# Patient Record
Sex: MALE | Race: ASIAN | NOT HISPANIC OR LATINO
[De-identification: names, ages, dates, MRNs, and addresses within clinical notes are randomized per-mention and may not be internally consistent; named-entity substitution may affect disease eponyms.]

---

## 2017-11-08 PROBLEM — Z00.00 ENCOUNTER FOR PREVENTIVE HEALTH EXAMINATION: Noted: 2017-11-08

## 2017-11-15 ENCOUNTER — APPOINTMENT (OUTPATIENT)
Dept: ORTHOPEDIC SURGERY | Facility: CLINIC | Age: 67
End: 2017-11-15
Payer: MEDICARE

## 2017-11-15 VITALS
DIASTOLIC BLOOD PRESSURE: 85 MMHG | HEIGHT: 69 IN | WEIGHT: 212 LBS | HEART RATE: 98 BPM | SYSTOLIC BLOOD PRESSURE: 148 MMHG | BODY MASS INDEX: 31.4 KG/M2

## 2017-11-15 DIAGNOSIS — Z78.9 OTHER SPECIFIED HEALTH STATUS: ICD-10-CM

## 2017-11-15 DIAGNOSIS — Z86.39 PERSONAL HISTORY OF OTHER ENDOCRINE, NUTRITIONAL AND METABOLIC DISEASE: ICD-10-CM

## 2017-11-15 PROCEDURE — 73564 X-RAY EXAM KNEE 4 OR MORE: CPT | Mod: 50

## 2017-11-15 PROCEDURE — 99204 OFFICE O/P NEW MOD 45 MIN: CPT | Mod: 25

## 2017-11-15 PROCEDURE — 20610 DRAIN/INJ JOINT/BURSA W/O US: CPT | Mod: 50

## 2017-11-15 RX ORDER — METFORMIN ER 500 MG 500 MG/1
500 TABLET ORAL
Qty: 360 | Refills: 0 | Status: ACTIVE | COMMUNITY
Start: 2017-04-03

## 2017-11-15 RX ORDER — PANTOPRAZOLE 40 MG/1
40 TABLET, DELAYED RELEASE ORAL
Qty: 90 | Refills: 0 | Status: ACTIVE | COMMUNITY
Start: 2017-05-10

## 2017-11-15 RX ORDER — 70%ISOPROPYL ALCOHOL 0.7 ML/ML
70 SWAB TOPICAL
Qty: 300 | Refills: 0 | Status: ACTIVE | COMMUNITY
Start: 2017-05-30

## 2017-11-15 RX ORDER — SITAGLIPTIN 50 MG/1
50 TABLET, FILM COATED ORAL
Qty: 90 | Refills: 0 | Status: ACTIVE | COMMUNITY
Start: 2017-04-17

## 2017-11-15 RX ORDER — GABAPENTIN 100 MG/1
100 CAPSULE ORAL
Qty: 90 | Refills: 0 | Status: ACTIVE | COMMUNITY
Start: 2017-04-15

## 2017-11-15 RX ORDER — COLCHICINE 0.6 MG/1
0.6 TABLET ORAL
Qty: 90 | Refills: 0 | Status: ACTIVE | COMMUNITY
Start: 2017-03-06

## 2017-11-15 RX ORDER — LINAGLIPTIN 5 MG/1
5 TABLET, FILM COATED ORAL
Qty: 90 | Refills: 0 | Status: ACTIVE | COMMUNITY
Start: 2017-05-10

## 2017-11-15 RX ORDER — AMITRIPTYLINE HYDROCHLORIDE 25 MG/1
25 TABLET, FILM COATED ORAL
Qty: 180 | Refills: 0 | Status: ACTIVE | COMMUNITY
Start: 2017-06-14

## 2017-11-15 RX ORDER — MELOXICAM 15 MG/1
15 TABLET ORAL DAILY
Qty: 30 | Refills: 2 | Status: ACTIVE | COMMUNITY
Start: 2017-11-15 | End: 1900-01-01

## 2017-11-15 RX ORDER — EMPAGLIFLOZIN 10 MG/1
10 TABLET, FILM COATED ORAL
Qty: 30 | Refills: 0 | Status: ACTIVE | COMMUNITY
Start: 2017-01-10

## 2017-11-15 RX ORDER — ALLOPURINOL 300 MG/1
300 TABLET ORAL
Qty: 90 | Refills: 0 | Status: ACTIVE | COMMUNITY
Start: 2017-03-09

## 2017-11-15 RX ORDER — DILTIAZEM HYDROCHLORIDE 300 MG/1
300 CAPSULE, EXTENDED RELEASE ORAL
Qty: 30 | Refills: 0 | Status: ACTIVE | COMMUNITY
Start: 2016-12-27

## 2017-11-15 RX ORDER — GLIMEPIRIDE 4 MG/1
4 TABLET ORAL
Qty: 180 | Refills: 0 | Status: ACTIVE | COMMUNITY
Start: 2017-03-22

## 2017-11-15 RX ORDER — ATORVASTATIN CALCIUM 10 MG/1
10 TABLET, FILM COATED ORAL
Qty: 90 | Refills: 0 | Status: ACTIVE | COMMUNITY
Start: 2017-05-10

## 2017-11-15 RX ORDER — FUROSEMIDE 20 MG/1
20 TABLET ORAL
Qty: 45 | Refills: 0 | Status: ACTIVE | COMMUNITY
Start: 2017-05-10

## 2017-11-23 ENCOUNTER — INPATIENT (INPATIENT)
Facility: HOSPITAL | Age: 67
LOS: 0 days | Discharge: LEFT BEFORE DISCHARGE | End: 2017-11-24
Attending: HOSPITALIST | Admitting: HOSPITALIST

## 2017-11-23 VITALS
TEMPERATURE: 98 F | HEART RATE: 104 BPM | OXYGEN SATURATION: 100 % | SYSTOLIC BLOOD PRESSURE: 169 MMHG | RESPIRATION RATE: 16 BRPM | DIASTOLIC BLOOD PRESSURE: 97 MMHG

## 2017-11-23 VITALS
SYSTOLIC BLOOD PRESSURE: 159 MMHG | DIASTOLIC BLOOD PRESSURE: 64 MMHG | OXYGEN SATURATION: 99 % | HEART RATE: 98 BPM | RESPIRATION RATE: 18 BRPM

## 2017-11-23 DIAGNOSIS — E87.5 HYPERKALEMIA: ICD-10-CM

## 2017-11-23 LAB
ALBUMIN SERPL ELPH-MCNC: 4.3 G/DL — SIGNIFICANT CHANGE UP (ref 3.3–5)
ALP SERPL-CCNC: 137 U/L — HIGH (ref 40–120)
ALT FLD-CCNC: 77 U/L — HIGH (ref 4–41)
AST SERPL-CCNC: 35 U/L — SIGNIFICANT CHANGE UP (ref 4–40)
BASE EXCESS BLDV CALC-SCNC: 2.5 MMOL/L — SIGNIFICANT CHANGE UP
BASOPHILS # BLD AUTO: 0.07 K/UL — SIGNIFICANT CHANGE UP (ref 0–0.2)
BASOPHILS NFR BLD AUTO: 0.4 % — SIGNIFICANT CHANGE UP (ref 0–2)
BILIRUB SERPL-MCNC: 0.5 MG/DL — SIGNIFICANT CHANGE UP (ref 0.2–1.2)
BLOOD GAS VENOUS - CREATININE: 1.69 MG/DL — HIGH (ref 0.5–1.3)
BUN SERPL-MCNC: 44 MG/DL — HIGH (ref 7–23)
CALCIUM SERPL-MCNC: 9.8 MG/DL — SIGNIFICANT CHANGE UP (ref 8.4–10.5)
CHLORIDE BLDV-SCNC: 102 MMOL/L — SIGNIFICANT CHANGE UP (ref 96–108)
CHLORIDE SERPL-SCNC: 97 MMOL/L — LOW (ref 98–107)
CO2 SERPL-SCNC: 23 MMOL/L — SIGNIFICANT CHANGE UP (ref 22–31)
CREAT SERPL-MCNC: 1.83 MG/DL — HIGH (ref 0.5–1.3)
EOSINOPHIL # BLD AUTO: 0.75 K/UL — HIGH (ref 0–0.5)
EOSINOPHIL NFR BLD AUTO: 4.1 % — SIGNIFICANT CHANGE UP (ref 0–6)
GAS PNL BLDV: 127 MMOL/L — LOW (ref 136–146)
GLUCOSE BLDV-MCNC: 98 — SIGNIFICANT CHANGE UP (ref 70–99)
GLUCOSE SERPL-MCNC: 98 MG/DL — SIGNIFICANT CHANGE UP (ref 70–99)
HCO3 BLDV-SCNC: 24 MMOL/L — SIGNIFICANT CHANGE UP (ref 20–27)
HCT VFR BLD CALC: 44.7 % — SIGNIFICANT CHANGE UP (ref 39–50)
HCT VFR BLDV CALC: 46.5 % — SIGNIFICANT CHANGE UP (ref 39–51)
HGB BLD-MCNC: 14.7 G/DL — SIGNIFICANT CHANGE UP (ref 13–17)
HGB BLDV-MCNC: 15.2 G/DL — SIGNIFICANT CHANGE UP (ref 13–17)
IMM GRANULOCYTES # BLD AUTO: 0.32 # — SIGNIFICANT CHANGE UP
IMM GRANULOCYTES NFR BLD AUTO: 1.8 % — HIGH (ref 0–1.5)
LACTATE BLDV-MCNC: 2.6 MMOL/L — HIGH (ref 0.5–2)
LYMPHOCYTES # BLD AUTO: 11.4 % — LOW (ref 13–44)
LYMPHOCYTES # BLD AUTO: 2.07 K/UL — SIGNIFICANT CHANGE UP (ref 1–3.3)
MCHC RBC-ENTMCNC: 29.2 PG — SIGNIFICANT CHANGE UP (ref 27–34)
MCHC RBC-ENTMCNC: 32.9 % — SIGNIFICANT CHANGE UP (ref 32–36)
MCV RBC AUTO: 88.9 FL — SIGNIFICANT CHANGE UP (ref 80–100)
MONOCYTES # BLD AUTO: 1.24 K/UL — HIGH (ref 0–0.9)
MONOCYTES NFR BLD AUTO: 6.9 % — SIGNIFICANT CHANGE UP (ref 2–14)
NEUTROPHILS # BLD AUTO: 13.65 K/UL — HIGH (ref 1.8–7.4)
NEUTROPHILS NFR BLD AUTO: 75.4 % — SIGNIFICANT CHANGE UP (ref 43–77)
NRBC # FLD: 0 — SIGNIFICANT CHANGE UP
PCO2 BLDV: 53 MMHG — HIGH (ref 41–51)
PH BLDV: 7.34 PH — SIGNIFICANT CHANGE UP (ref 7.32–7.43)
PLATELET # BLD AUTO: 212 K/UL — SIGNIFICANT CHANGE UP (ref 150–400)
PMV BLD: 10 FL — SIGNIFICANT CHANGE UP (ref 7–13)
PO2 BLDV: 26 MMHG — LOW (ref 35–40)
POTASSIUM BLDV-SCNC: 6 MMOL/L — HIGH (ref 3.4–4.5)
POTASSIUM SERPL-MCNC: 5.7 MMOL/L — HIGH (ref 3.5–5.3)
POTASSIUM SERPL-MCNC: 6.7 MMOL/L — CRITICAL HIGH (ref 3.5–5.3)
POTASSIUM SERPL-SCNC: 5.7 MMOL/L — HIGH (ref 3.5–5.3)
POTASSIUM SERPL-SCNC: 6.7 MMOL/L — CRITICAL HIGH (ref 3.5–5.3)
PROT SERPL-MCNC: 7.4 G/DL — SIGNIFICANT CHANGE UP (ref 6–8.3)
RBC # BLD: 5.03 M/UL — SIGNIFICANT CHANGE UP (ref 4.2–5.8)
RBC # FLD: 14 % — SIGNIFICANT CHANGE UP (ref 10.3–14.5)
SAO2 % BLDV: 39.9 % — LOW (ref 60–85)
SODIUM SERPL-SCNC: 133 MMOL/L — LOW (ref 135–145)
WBC # BLD: 18.1 K/UL — HIGH (ref 3.8–10.5)
WBC # FLD AUTO: 18.1 K/UL — HIGH (ref 3.8–10.5)

## 2017-11-23 RX ORDER — INSULIN HUMAN 100 [IU]/ML
10 INJECTION, SOLUTION SUBCUTANEOUS ONCE
Refills: 0 | Status: COMPLETED | OUTPATIENT
Start: 2017-11-23 | End: 2017-11-23

## 2017-11-23 RX ORDER — SODIUM CHLORIDE 9 MG/ML
1000 INJECTION INTRAMUSCULAR; INTRAVENOUS; SUBCUTANEOUS ONCE
Refills: 0 | Status: COMPLETED | OUTPATIENT
Start: 2017-11-23 | End: 2017-11-23

## 2017-11-23 RX ORDER — CALCIUM GLUCONATE 100 MG/ML
1 VIAL (ML) INTRAVENOUS ONCE
Refills: 0 | Status: COMPLETED | OUTPATIENT
Start: 2017-11-23 | End: 2017-11-23

## 2017-11-23 RX ORDER — SODIUM POLYSTYRENE SULFONATE 4.1 MEQ/G
30 POWDER, FOR SUSPENSION ORAL ONCE
Refills: 0 | Status: COMPLETED | OUTPATIENT
Start: 2017-11-23 | End: 2017-11-23

## 2017-11-23 RX ORDER — SODIUM BICARBONATE 1 MEQ/ML
50 SYRINGE (ML) INTRAVENOUS ONCE
Refills: 0 | Status: COMPLETED | OUTPATIENT
Start: 2017-11-23 | End: 2017-11-23

## 2017-11-23 RX ORDER — ALBUTEROL 90 UG/1
2.5 AEROSOL, METERED ORAL
Refills: 0 | Status: COMPLETED | OUTPATIENT
Start: 2017-11-23 | End: 2017-11-23

## 2017-11-23 RX ORDER — CALCIUM GLUCONATE 100 MG/ML
2 VIAL (ML) INTRAVENOUS ONCE
Refills: 0 | Status: DISCONTINUED | OUTPATIENT
Start: 2017-11-23 | End: 2017-11-23

## 2017-11-23 RX ORDER — ALBUTEROL 90 UG/1
2.5 AEROSOL, METERED ORAL ONCE
Refills: 0 | Status: COMPLETED | OUTPATIENT
Start: 2017-11-23 | End: 2017-11-23

## 2017-11-23 RX ORDER — DEXTROSE 50 % IN WATER 50 %
50 SYRINGE (ML) INTRAVENOUS ONCE
Refills: 0 | Status: COMPLETED | OUTPATIENT
Start: 2017-11-23 | End: 2017-11-23

## 2017-11-23 RX ADMIN — Medication 50 MILLIEQUIVALENT(S): at 14:43

## 2017-11-23 RX ADMIN — ALBUTEROL 2.5 MILLIGRAM(S): 90 AEROSOL, METERED ORAL at 15:09

## 2017-11-23 RX ADMIN — Medication 50 MILLILITER(S): at 14:43

## 2017-11-23 RX ADMIN — ALBUTEROL 2.5 MILLIGRAM(S): 90 AEROSOL, METERED ORAL at 18:27

## 2017-11-23 RX ADMIN — Medication 50 MILLIEQUIVALENT(S): at 18:27

## 2017-11-23 RX ADMIN — Medication 200 GRAM(S): at 14:43

## 2017-11-23 RX ADMIN — SODIUM POLYSTYRENE SULFONATE 30 GRAM(S): 4.1 POWDER, FOR SUSPENSION ORAL at 17:52

## 2017-11-23 RX ADMIN — ALBUTEROL 2.5 MILLIGRAM(S): 90 AEROSOL, METERED ORAL at 14:24

## 2017-11-23 RX ADMIN — SODIUM CHLORIDE 1000 MILLILITER(S): 9 INJECTION INTRAMUSCULAR; INTRAVENOUS; SUBCUTANEOUS at 13:36

## 2017-11-23 RX ADMIN — INSULIN HUMAN 10 UNIT(S): 100 INJECTION, SOLUTION SUBCUTANEOUS at 14:43

## 2017-11-23 RX ADMIN — ALBUTEROL 2.5 MILLIGRAM(S): 90 AEROSOL, METERED ORAL at 14:44

## 2017-11-23 NOTE — ED PROVIDER NOTE - ATTENDING CONTRIBUTION TO CARE
I performed a history and physical exam of the patient and discussed their management with the resident. I reviewed the resident's note and agree with the documented findings and plan of care. My medical decision making and observations are found above.  Lungs clear, abd soft.

## 2017-11-23 NOTE — ED PROVIDER NOTE - CARE PLAN
Instructions for follow-up, activity and diet:	The patient was given verbal and written discharge instructions. Specifically, instructions when to return to the ED and when to seek follow-up from their pcp was discussed. Any specialty follow-up was discussed, including how to make an appointment.  Instructions were discussed in simple, plain language and was understood by the patient. The patient understands that should their symptoms worsen or any new symptoms arise, they should return to the ED immediately for further evaluation. Principal Discharge DX:	Hyperkalemia  Instructions for follow-up, activity and diet:	The patient was given verbal and written discharge instructions. Specifically, instructions when to return to the ED and when to seek follow-up from their pcp was discussed. Any specialty follow-up was discussed, including how to make an appointment.  Instructions were discussed in simple, plain language and was understood by the patient. The patient understands that should their symptoms worsen or any new symptoms arise, they should return to the ED immediately for further evaluation.

## 2017-11-23 NOTE — ED PROVIDER NOTE - PROGRESS NOTE DETAILS
CHONG Hendricks called for admission. Awaiting callback. Jonathon Erickson called for admission. Awaiting callback. Kamla: Jonathon Erickson called for admission. Awaiting callback. Kamla: Received callback from [facility]. Informed her of pt status here. [facility] does not admit to [facility] and requests admission. Hospitalist paged. Awaiting callback. Kamla: Pt received a bed but now refuses to stay b/c he wants to go home for thanksgiving. Has multiple family members visiting and does not want them to think he is sick and in the hospital. Risks of leaving including cardiac arrest 2/2 hyperkalemia discused at length with patient and he still refuses to stay. He states he will AMA and then return after dinner to be admitted. Discussed plan with hospitalist Jeanine. Pt given kayxalate. Will administer another dose bicarb and albuterol prior to dc.

## 2017-11-23 NOTE — ED PROVIDER NOTE - OBJECTIVE STATEMENT
67yM hx htn, hld, dm, gout sent to ED from PMD office for elevated K on screening bloodwork. Pt sent to ED for elevated K in 2016 and found to have K 4.4 in ED (subsequently discharged). Had bl knee steroid injxn 1wk ago and had fsg >600 the night following injxn. Went to [place] ED that night and found to have elevated K per patient but again was discharged from ED after several hours. Pt checks sugar daily and has been low 100's the rest of the week. Saw doc on call for PMLOUISE Moore yesterday ([place]) for followup visit and had basic labs drawn. Called to return to ED today. Denies all symptoms. Well appearing. 67yM hx htn (on enalapril), hld, dm, gout sent to ED from PMD office for elevated K on screening bloodwork. Pt sent to ED for elevated K in 2016 and found to have K 4.4 in ED (subsequently discharged). Had bl knee steroid injxn 1wk ago and had fsg >600 the night following injxn. Went to [place] ED that night and found to have elevated K per patient but again was discharged from ED after several hours. Pt checks sugar daily and has been low 100's the rest of the week. Saw doc on call for PMD Teresa yesterday ([place]) for followup visit and had basic labs drawn. Called to return to ED today. Denies all symptoms. Well appearing.

## 2017-11-23 NOTE — ED PROVIDER NOTE - MEDICAL DECISION MAKING DETAILS
hyperK in pmd office on screening labwork, no symptoms->ekg, repeat K to confirm if it is actually elevated hyperK in pmd office on screening labwork, no symptoms->ekg, repeat K to confirm if it is actually elevated  Brandon: Patient sent in for high K+ from office.  No complaints. Recent steroidss with high glucose. No fever, no cough, no urinary co. Recheck K+ and hydrate hyperK in pmd office on screening labwork, no symptoms->ekg, repeat K to confirm if it is actually elevated.  Brandon: Patient sent in for high K+ from office.  No complaints. Recent steroids with high glucose. No fever, no cough, no urinary co. Recheck K+ and hydrate

## 2017-11-23 NOTE — ED ADULT TRIAGE NOTE - CHIEF COMPLAINT QUOTE
Pt coming into ED for eval of elevated K+ as per MD, value around 7.0 as per wife. Pt denies any cp/discomfort, denies headache/dizziness, tachycardic in triage. Pt denies sob breathing even and unlabored. EKG to be obtained.

## 2017-11-23 NOTE — ED ADULT NURSE NOTE - OBJECTIVE STATEMENT
AOX3, ambulatory with steady gait, was referred to the ED for elevated potassium that was drawn yesterday during a routine visit to MD, K around 7 as per wife. Pt denies any physical complaints, comfortable laying in stretcher, connected to the monitor:NSR, VS stable as noted, MD torre done, 18g placed on R AC, NAD, will monitor, wife at bedside.

## 2018-01-03 ENCOUNTER — APPOINTMENT (OUTPATIENT)
Dept: ORTHOPEDIC SURGERY | Facility: CLINIC | Age: 68
End: 2018-01-03

## 2018-01-06 ENCOUNTER — EMERGENCY (EMERGENCY)
Facility: HOSPITAL | Age: 68
LOS: 1 days | Discharge: HOME | End: 2018-01-06
Attending: EMERGENCY MEDICINE | Admitting: EMERGENCY MEDICINE
Payer: MEDICARE

## 2018-01-06 VITALS — RESPIRATION RATE: 18 BRPM | HEART RATE: 82 BPM | DIASTOLIC BLOOD PRESSURE: 98 MMHG | SYSTOLIC BLOOD PRESSURE: 146 MMHG

## 2018-01-06 VITALS
HEART RATE: 77 BPM | SYSTOLIC BLOOD PRESSURE: 133 MMHG | RESPIRATION RATE: 16 BRPM | DIASTOLIC BLOOD PRESSURE: 74 MMHG | OXYGEN SATURATION: 96 %

## 2018-01-06 LAB
ALBUMIN SERPL ELPH-MCNC: 3.9 G/DL — SIGNIFICANT CHANGE UP (ref 3.3–5)
ALP SERPL-CCNC: 140 U/L — HIGH (ref 40–120)
ALT FLD-CCNC: 36 U/L RC — SIGNIFICANT CHANGE UP (ref 10–45)
ANION GAP SERPL CALC-SCNC: 17 MMOL/L — SIGNIFICANT CHANGE UP (ref 5–17)
APTT BLD: 31.1 SEC — SIGNIFICANT CHANGE UP (ref 27.5–37.4)
AST SERPL-CCNC: 24 U/L — SIGNIFICANT CHANGE UP (ref 10–40)
BASE EXCESS BLDV CALC-SCNC: -3.9 MMOL/L — LOW (ref -2–2)
BASOPHILS # BLD AUTO: 0 K/UL — SIGNIFICANT CHANGE UP (ref 0–0.2)
BASOPHILS NFR BLD AUTO: 0.2 % — SIGNIFICANT CHANGE UP (ref 0–2)
BILIRUB SERPL-MCNC: 0.2 MG/DL — SIGNIFICANT CHANGE UP (ref 0.2–1.2)
BLD GP AB SCN SERPL QL: NEGATIVE — SIGNIFICANT CHANGE UP
BUN SERPL-MCNC: 15 MG/DL — SIGNIFICANT CHANGE UP (ref 7–23)
CA-I SERPL-SCNC: 1.1 MMOL/L — LOW (ref 1.12–1.3)
CALCIUM SERPL-MCNC: 8.5 MG/DL — SIGNIFICANT CHANGE UP (ref 8.4–10.5)
CHLORIDE BLDV-SCNC: 103 MMOL/L — SIGNIFICANT CHANGE UP (ref 96–108)
CHLORIDE SERPL-SCNC: 97 MMOL/L — SIGNIFICANT CHANGE UP (ref 96–108)
CO2 BLDV-SCNC: 22 MMOL/L — SIGNIFICANT CHANGE UP (ref 22–30)
CO2 SERPL-SCNC: 17 MMOL/L — LOW (ref 22–31)
CREAT SERPL-MCNC: 1.36 MG/DL — HIGH (ref 0.5–1.3)
EOSINOPHIL # BLD AUTO: 0.1 K/UL — SIGNIFICANT CHANGE UP (ref 0–0.5)
EOSINOPHIL NFR BLD AUTO: 1.3 % — SIGNIFICANT CHANGE UP (ref 0–6)
ETHANOL SERPL-MCNC: 232 MG/DL — HIGH (ref 0–10)
GAS PNL BLDV: 131 MMOL/L — LOW (ref 136–145)
GAS PNL BLDV: SIGNIFICANT CHANGE UP
GAS PNL BLDV: SIGNIFICANT CHANGE UP
GLUCOSE BLDV-MCNC: 243 MG/DL — HIGH (ref 70–99)
GLUCOSE SERPL-MCNC: 271 MG/DL — HIGH (ref 70–99)
HCO3 BLDV-SCNC: 21 MMOL/L — SIGNIFICANT CHANGE UP (ref 21–29)
HCT VFR BLD CALC: 41.7 % — SIGNIFICANT CHANGE UP (ref 39–50)
HCT VFR BLDA CALC: 40 % — SIGNIFICANT CHANGE UP (ref 39–50)
HGB BLD CALC-MCNC: 13.2 G/DL — SIGNIFICANT CHANGE UP (ref 13–17)
HGB BLD-MCNC: 14.9 G/DL — SIGNIFICANT CHANGE UP (ref 13–17)
HOROWITZ INDEX BLDV+IHG-RTO: SIGNIFICANT CHANGE UP
INR BLD: 0.95 RATIO — SIGNIFICANT CHANGE UP (ref 0.88–1.16)
LACTATE BLDV-MCNC: 2.3 MMOL/L — HIGH (ref 0.7–2)
LIDOCAIN IGE QN: 54 U/L — SIGNIFICANT CHANGE UP (ref 7–60)
LYMPHOCYTES # BLD AUTO: 28.4 % — SIGNIFICANT CHANGE UP (ref 13–44)
LYMPHOCYTES # BLD AUTO: 3 K/UL — SIGNIFICANT CHANGE UP (ref 1–3.3)
MCHC RBC-ENTMCNC: 33.3 PG — SIGNIFICANT CHANGE UP (ref 27–34)
MCHC RBC-ENTMCNC: 35.6 GM/DL — SIGNIFICANT CHANGE UP (ref 32–36)
MCV RBC AUTO: 93.4 FL — SIGNIFICANT CHANGE UP (ref 80–100)
MONOCYTES # BLD AUTO: 0.7 K/UL — SIGNIFICANT CHANGE UP (ref 0–0.9)
MONOCYTES NFR BLD AUTO: 6.8 % — SIGNIFICANT CHANGE UP (ref 2–14)
NEUTROPHILS # BLD AUTO: 6.8 K/UL — SIGNIFICANT CHANGE UP (ref 1.8–7.4)
NEUTROPHILS NFR BLD AUTO: 63.3 % — SIGNIFICANT CHANGE UP (ref 43–77)
PCO2 BLDV: 40 MMHG — SIGNIFICANT CHANGE UP (ref 35–50)
PH BLDV: 7.34 — LOW (ref 7.35–7.45)
PLATELET # BLD AUTO: 153 K/UL — SIGNIFICANT CHANGE UP (ref 150–400)
PO2 BLDV: 101 MMHG — HIGH (ref 25–45)
POTASSIUM BLDV-SCNC: 4.2 MMOL/L — SIGNIFICANT CHANGE UP (ref 3.5–5)
POTASSIUM SERPL-MCNC: 4.6 MMOL/L — SIGNIFICANT CHANGE UP (ref 3.5–5.3)
POTASSIUM SERPL-SCNC: 4.6 MMOL/L — SIGNIFICANT CHANGE UP (ref 3.5–5.3)
PROT SERPL-MCNC: 7.1 G/DL — SIGNIFICANT CHANGE UP (ref 6–8.3)
PROTHROM AB SERPL-ACNC: 10.3 SEC — SIGNIFICANT CHANGE UP (ref 9.8–12.7)
RBC # BLD: 4.47 M/UL — SIGNIFICANT CHANGE UP (ref 4.2–5.8)
RBC # FLD: 13.5 % — SIGNIFICANT CHANGE UP (ref 10.3–14.5)
RH IG SCN BLD-IMP: POSITIVE — SIGNIFICANT CHANGE UP
SAO2 % BLDV: 97 % — HIGH (ref 67–88)
SODIUM SERPL-SCNC: 131 MMOL/L — LOW (ref 135–145)
WBC # BLD: 10.7 K/UL — HIGH (ref 3.8–10.5)
WBC # FLD AUTO: 10.7 K/UL — HIGH (ref 3.8–10.5)

## 2018-01-06 PROCEDURE — 82947 ASSAY GLUCOSE BLOOD QUANT: CPT

## 2018-01-06 PROCEDURE — 72125 CT NECK SPINE W/O DYE: CPT | Mod: 26

## 2018-01-06 PROCEDURE — 85014 HEMATOCRIT: CPT

## 2018-01-06 PROCEDURE — 93010 ELECTROCARDIOGRAM REPORT: CPT

## 2018-01-06 PROCEDURE — 71045 X-RAY EXAM CHEST 1 VIEW: CPT | Mod: 26

## 2018-01-06 PROCEDURE — 70450 CT HEAD/BRAIN W/O DYE: CPT | Mod: 26

## 2018-01-06 PROCEDURE — 85610 PROTHROMBIN TIME: CPT

## 2018-01-06 PROCEDURE — 82803 BLOOD GASES ANY COMBINATION: CPT

## 2018-01-06 PROCEDURE — 71045 X-RAY EXAM CHEST 1 VIEW: CPT

## 2018-01-06 PROCEDURE — 86900 BLOOD TYPING SEROLOGIC ABO: CPT

## 2018-01-06 PROCEDURE — 74177 CT ABD & PELVIS W/CONTRAST: CPT | Mod: 26

## 2018-01-06 PROCEDURE — 80053 COMPREHEN METABOLIC PANEL: CPT

## 2018-01-06 PROCEDURE — 86901 BLOOD TYPING SEROLOGIC RH(D): CPT

## 2018-01-06 PROCEDURE — 93005 ELECTROCARDIOGRAM TRACING: CPT

## 2018-01-06 PROCEDURE — 84132 ASSAY OF SERUM POTASSIUM: CPT

## 2018-01-06 PROCEDURE — 82962 GLUCOSE BLOOD TEST: CPT

## 2018-01-06 PROCEDURE — 85730 THROMBOPLASTIN TIME PARTIAL: CPT

## 2018-01-06 PROCEDURE — 82330 ASSAY OF CALCIUM: CPT

## 2018-01-06 PROCEDURE — 73110 X-RAY EXAM OF WRIST: CPT | Mod: 26,LT

## 2018-01-06 PROCEDURE — 83605 ASSAY OF LACTIC ACID: CPT

## 2018-01-06 PROCEDURE — 84295 ASSAY OF SERUM SODIUM: CPT

## 2018-01-06 PROCEDURE — 70450 CT HEAD/BRAIN W/O DYE: CPT

## 2018-01-06 PROCEDURE — 74177 CT ABD & PELVIS W/CONTRAST: CPT

## 2018-01-06 PROCEDURE — 80307 DRUG TEST PRSMV CHEM ANLYZR: CPT

## 2018-01-06 PROCEDURE — 85027 COMPLETE CBC AUTOMATED: CPT

## 2018-01-06 PROCEDURE — 83690 ASSAY OF LIPASE: CPT

## 2018-01-06 PROCEDURE — 86850 RBC ANTIBODY SCREEN: CPT

## 2018-01-06 PROCEDURE — 73110 X-RAY EXAM OF WRIST: CPT

## 2018-01-06 PROCEDURE — 72125 CT NECK SPINE W/O DYE: CPT

## 2018-01-06 PROCEDURE — 71260 CT THORAX DX C+: CPT | Mod: 26

## 2018-01-06 PROCEDURE — 82435 ASSAY OF BLOOD CHLORIDE: CPT

## 2018-01-06 PROCEDURE — 71260 CT THORAX DX C+: CPT

## 2018-01-06 PROCEDURE — 99285 EMERGENCY DEPT VISIT HI MDM: CPT | Mod: 25

## 2018-01-06 RX ORDER — SODIUM CHLORIDE 9 MG/ML
1000 INJECTION INTRAMUSCULAR; INTRAVENOUS; SUBCUTANEOUS ONCE
Qty: 0 | Refills: 0 | Status: COMPLETED | OUTPATIENT
Start: 2018-01-06 | End: 2018-01-06

## 2018-01-06 RX ADMIN — SODIUM CHLORIDE 1000 MILLILITER(S): 9 INJECTION INTRAMUSCULAR; INTRAVENOUS; SUBCUTANEOUS at 22:30
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
## 2018-01-06 NOTE — ED PROVIDER NOTE - OBJECTIVE STATEMENT
67 year old male past medical history DM2, presents to the ED for fall. Patient drank half bottle of Anup Walker Blue label, and fell down stairs about 11 steps. Wife witnessed and states that patient did not lose consciousness. Patient reports no complaints at this time. No pain. Patient with abrasion on left wrist. Ecchymosis to right side of abdomen but this is where he injects insulin. He reports no visual changes, nausea, vomiting, dizziness, lightheadedness, chest pain, shortness of breath, abdominal pain. No recent fevers, chills, congestion, rhinorrhea. No A/C.
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
## 2018-01-06 NOTE — ED PROVIDER NOTE - CARE PLAN
Principal Discharge DX:	Fall, initial encounter Principal Discharge DX:	Altered mental status  Secondary Diagnosis:	Fall, initial encounter  Secondary Diagnosis:	Alcohol intoxication
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
## 2018-01-06 NOTE — ED PROVIDER NOTE - PROGRESS NOTE DETAILS
Pt clinically sober, ambulating w/out difficulty. Denies any pain. Discussed w/ trauma who agree w/ plan. DOROTHY: Stable , sober, wife at bedside, stable to d/c home

## 2018-01-06 NOTE — CONSULT NOTE ADULT - ATTENDING COMMENTS
Fall down a flight of stairs  arrived with impaired mental status  CT imaging, exam do not demonstrate any concerning injury pattern  would observe mental status until clearing

## 2018-01-07 NOTE — ED ADULT NURSE REASSESSMENT NOTE - COMFORT CARE
wait time explained/plan of care explained/warm blanket provided
plan of care explained/wait time explained/meal provided
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
## 2018-05-16 ENCOUNTER — APPOINTMENT (OUTPATIENT)
Dept: ORTHOPEDIC SURGERY | Facility: CLINIC | Age: 68
End: 2018-05-16

## 2018-06-01 ENCOUNTER — EMERGENCY (EMERGENCY)
Facility: HOSPITAL | Age: 68
LOS: 1 days | Discharge: HOME | End: 2018-06-01
Admitting: EMERGENCY MEDICINE
Payer: MEDICARE

## 2018-06-01 VITALS
DIASTOLIC BLOOD PRESSURE: 83 MMHG | RESPIRATION RATE: 16 BRPM | OXYGEN SATURATION: 97 % | SYSTOLIC BLOOD PRESSURE: 158 MMHG | HEART RATE: 87 BPM | TEMPERATURE: 99 F

## 2018-06-01 PROCEDURE — 99284 EMERGENCY DEPT VISIT MOD MDM: CPT

## 2018-06-01 PROCEDURE — 72125 CT NECK SPINE W/O DYE: CPT | Mod: 26

## 2018-06-01 RX ORDER — DIAZEPAM 5 MG
5 TABLET ORAL ONCE
Refills: 0 | Status: DISCONTINUED | OUTPATIENT
Start: 2018-06-01 | End: 2018-06-01

## 2018-06-01 RX ORDER — ACETAMINOPHEN 500 MG
975 TABLET ORAL ONCE
Refills: 0 | Status: COMPLETED | OUTPATIENT
Start: 2018-06-01 | End: 2018-06-01

## 2018-06-01 RX ORDER — DIAZEPAM 5 MG
1 TABLET ORAL
Qty: 9 | Refills: 0
Start: 2018-06-01 | End: 2018-06-03

## 2018-06-01 RX ADMIN — Medication 5 MILLIGRAM(S): at 17:07

## 2018-06-01 RX ADMIN — Medication 975 MILLIGRAM(S): at 17:07
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
## 2018-06-01 NOTE — ED PROVIDER NOTE - PMH
CKD (chronic kidney disease)    DM (diabetes mellitus)    Gout    HLD (hyperlipidemia)    HTN (hypertension)

## 2018-06-01 NOTE — ED PROVIDER NOTE - PROGRESS NOTE DETAILS
PA Baseil: CT negative. C collar removed. Significant improvement in pain with medications. FROM, NVI. Pt stable for dc home w/fu PA Baseil: CT negative for fx or traumatic subluxation. C collar removed. Significant improvement in pain with medications. FROM, NVI. Reviewed findings of ct including thyroid nodule. Pt will f/u with PMD on monday. Pt stable for dc home w/fu
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
## 2018-06-01 NOTE — ED PROVIDER NOTE - CARE PLAN
Principal Discharge DX:	Neck pain  Assessment and plan of treatment:	Rest, advance activity as tolerated  Apply heat to affected area 15 min on/15 min off  Take Tylenol 600-975mg every 6 hours with food as needed for pain  Take Valium 5mg every 8 hours as needed - DO NOT DRIVE ON THIS MEDICATION  Follow up with your PMD within 2-3 days  Return to the ER for worsening

## 2018-06-01 NOTE — ED ADULT TRIAGE NOTE - SPO2 (%)
Pt did not want to wait recommended 15 minutes for Toradol injection. Educated on possible side-effects and provider aware.   97

## 2018-06-01 NOTE — ED PROVIDER NOTE - PLAN OF CARE
Rest, advance activity as tolerated  Apply heat to affected area 15 min on/15 min off  Take Tylenol 600-975mg every 6 hours with food as needed for pain  Take Valium 5mg every 8 hours as needed - DO NOT DRIVE ON THIS MEDICATION  Follow up with your PMD within 2-3 days  Return to the ER for worsening

## 2018-06-01 NOTE — ED PROVIDER NOTE - CHPI ED SYMPTOMS NEG
no back pain/no disorientation/no dizziness/no headache/no laceration/no loss of consciousness/no bruising/no decreased eating/drinking/no difficulty bearing weight/no sleeping issues

## 2019-12-10 ENCOUNTER — APPOINTMENT (OUTPATIENT)
Dept: RADIOLOGY | Facility: IMAGING CENTER | Age: 69
End: 2019-12-10
Payer: MEDICARE

## 2019-12-10 ENCOUNTER — OUTPATIENT (OUTPATIENT)
Dept: OUTPATIENT SERVICES | Facility: HOSPITAL | Age: 69
LOS: 1 days | End: 2019-12-10
Payer: MEDICARE

## 2019-12-10 DIAGNOSIS — Z00.8 ENCOUNTER FOR OTHER GENERAL EXAMINATION: ICD-10-CM

## 2019-12-10 PROCEDURE — 71046 X-RAY EXAM CHEST 2 VIEWS: CPT | Mod: 26

## 2019-12-10 PROCEDURE — 71046 X-RAY EXAM CHEST 2 VIEWS: CPT

## 2020-11-11 ENCOUNTER — APPOINTMENT (OUTPATIENT)
Dept: OTOLARYNGOLOGY | Facility: CLINIC | Age: 70
End: 2020-11-11
Payer: MEDICAID

## 2020-11-11 VITALS
SYSTOLIC BLOOD PRESSURE: 120 MMHG | DIASTOLIC BLOOD PRESSURE: 78 MMHG | HEART RATE: 53 BPM | WEIGHT: 230 LBS | HEIGHT: 69 IN | TEMPERATURE: 97.3 F | BODY MASS INDEX: 34.07 KG/M2

## 2020-11-11 DIAGNOSIS — L29.9 PRURITUS, UNSPECIFIED: ICD-10-CM

## 2020-11-11 DIAGNOSIS — H92.03 OTALGIA, BILATERAL: ICD-10-CM

## 2020-11-11 DIAGNOSIS — H91.93 UNSPECIFIED HEARING LOSS, BILATERAL: ICD-10-CM

## 2020-11-11 PROCEDURE — 99204 OFFICE O/P NEW MOD 45 MIN: CPT | Mod: 25

## 2020-11-11 PROCEDURE — 92557 COMPREHENSIVE HEARING TEST: CPT

## 2020-11-11 PROCEDURE — 92567 TYMPANOMETRY: CPT

## 2020-11-11 PROCEDURE — 99072 ADDL SUPL MATRL&STAF TM PHE: CPT

## 2020-11-11 NOTE — REASON FOR VISIT
[Initial Evaluation] : an initial evaluation for [FreeTextEntry2] : otalgia, pruritus, change in hearing

## 2020-11-11 NOTE — PHYSICAL EXAM
[de-identified] : dry skin in eac au [Midline] : trachea located in midline position [Normal] : no rashes

## 2020-11-11 NOTE — CONSULT LETTER
[Dear  ___] : Dear  [unfilled], [Consult Letter:] : I had the pleasure of evaluating your patient, [unfilled]. [Please see my note below.] : Please see my note below. [Consult Closing:] : Thank you very much for allowing me to participate in the care of this patient.  If you have any questions, please do not hesitate to contact me. [Sincerely,] : Sincerely, [FreeTextEntry3] : Sincerely, \par \par Melida Downey MD\par Otolaryngology- Facial Plastics \par [address]\[place] [address]\par (P) - [phone]\par (F) - [phone]\par \par

## 2020-11-11 NOTE — END OF VISIT
[FreeTextEntry3] : I personally saw and examined NAHID LINN in detail.  I spoke to TOMMY Nazario regarding the assessment and plan of care. I performed the procedures and relevant physical exam.  I have reviewed the above assessment and plan of care and I agree.  I have made changes to the body of the note wherever necessary and appropriate.

## 2020-11-11 NOTE — ASSESSMENT
[FreeTextEntry1] : Patient is a 70 year old male who presents with b/l otalgia, pruritus, and change in hearing. 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 He is encouraged to not use the drops more than 7 days in a row. If the itching persists he is encouraged to contact the office so we can examine the ears.

## 2020-11-11 NOTE — HISTORY OF PRESENT ILLNESS
[de-identified] : Patient is a 70 year old male who presents with bilateral otalgia, pruritus, and change in hearing. He is unable to recall his last audiogram. He denies tinnitus, otorrhea, and dizziness. His symptoms have been present for the past year.

## 2020-11-19 ENCOUNTER — APPOINTMENT (OUTPATIENT)
Dept: ORTHOPEDIC SURGERY | Facility: CLINIC | Age: 70
End: 2020-11-19
Payer: MEDICARE

## 2020-11-19 VITALS
HEART RATE: 94 BPM | DIASTOLIC BLOOD PRESSURE: 71 MMHG | BODY MASS INDEX: 34.07 KG/M2 | SYSTOLIC BLOOD PRESSURE: 120 MMHG | WEIGHT: 230 LBS | OXYGEN SATURATION: 96 % | HEIGHT: 69 IN

## 2020-11-19 DIAGNOSIS — I10 ESSENTIAL (PRIMARY) HYPERTENSION: ICD-10-CM

## 2020-11-19 DIAGNOSIS — E78.00 PURE HYPERCHOLESTEROLEMIA, UNSPECIFIED: ICD-10-CM

## 2020-11-19 DIAGNOSIS — M17.0 BILATERAL PRIMARY OSTEOARTHRITIS OF KNEE: ICD-10-CM

## 2020-11-19 PROCEDURE — 73562 X-RAY EXAM OF KNEE 3: CPT | Mod: 50

## 2020-11-19 PROCEDURE — 99204 OFFICE O/P NEW MOD 45 MIN: CPT

## 2020-11-19 NOTE — HISTORY OF PRESENT ILLNESS
[FreeTextEntry1] : This is a 70-year-old gentleman who has been complaining of bilateral left more than right knee pain for many years.  He saw Dr. Davis gave him injections in bilateral knees in 2017.  At that point unfortunately his blood glucose is any need to be in the hospital.  He was scared to come back however has been having increasing pain.  It has now stopped him from walking however he does stop at points but can walk as much as he wants.  He does have some chronic kidney problems and cannot take NSAIDs.  He tried physical therapy that did not work.  He does not want any more injections.\par \par His sugars are currently 110 this morning with an A1c in the sevens. [Worsening] : worsening [___ yrs] : [unfilled] year(s) ago [3] : currently ~his/her~ pain is 3 out of 10 [Direct Pressure] : worsened by direct pressure [Joint Movement] : worsened by joint movement [Walking] : worsened by walking [Acetaminophen] : relieved by acetaminophen [Exercise Regimen] : not relieved by exercise regimen

## 2020-11-19 NOTE — DISCUSSION/SUMMARY
[Surgical risks reviewed] : Surgical risks reviewed [All Questions Answered] : Patient (and family) had all questions answered to an agreeable level of satisfaction [Interested in Proceeding] : Patient (and family) expressed understanding and interest in proceeding with the plan as outlined [de-identified] : Patient has significant degenerative arthrosis in bilateral left worse than the right knees.  He is symptomatic more than left.  He is requesting arthroplasty.  He is failed other treatments including physical therapy behavior modification and he has not a lot of anti-inflammatories and reacted poorly to injections in the past.  I think with his degree of arthrosis arthroplasty is appropriate.\par \par We discussed the risks benefits and alternatives of arthroplasty including the risks of bleeding requiring transfusion, infection, late infection from bacteremia, instability, malalignment, periprosthetic fracture, early need for revision, loosening, possible dislocation, numbness in the area of surgery, continued pain, altered gait, limb length discrepancy, deep vein thrombosis and medical and anesthetic complications.\par \par He is at increased risk also because of his diabetes chronic kidney problems and peripheral neuropathy.  I will have medically cleared by his medical doctor and have his sugars under the best control possible and then plan for arthroplasty of the left knee.\par \par If imaging was ordered, the patient was told to make an appointment to review findings right after all imaging is completed.\par \par We discussed risks, benefits and alternatives. Rationale of care was reviewed and all questions were answered. Patient (and family) had all questions answered to her degree of the level of satisfaction. Patient (and family) expressed understanding and interest in proceeding with the plan as outlined.\par \par \par \par \par This note was done with a voice recognition transcription software and any typos are related to this rather than medical error. Surgical risks reviewed. Patient (and family) had all questions answered to an agreeable level of satisfaction. Patient (and family) expressed understanding and interest in proceeding with the plan as outlined.  \par

## 2020-11-19 NOTE — DATA REVIEWED
[de-identified] : X-rays today AP lateral and patella views of bilateral knees show significant medial compartment arthritis more than lateral on the left worse than the right knee.  There is complete loss of joint space on the left knee medial side with significantly reduced joint space on the right medial side.  Lateral side is also somewhat reduced.  Patellofemoral joint also shows arthritis with decreased joint space as well as osteophytes and subchondral sclerosis throughout.

## 2020-11-19 NOTE — PHYSICAL EXAM
[FreeTextEntry1] : On exam the patient stands in good balance.  He has bilateral varus knees with straight alignment.  He has significant medial more than lateral joint line tenderness on the left worse than the right knee.  Range of motion is 3 degrees to 125 degrees in both knees.  He stable to varus and valgus and anterior testing.  He has bilateral crepitus with patellofemoral as well as the knee joint itself.  He has 2+ pulses bilaterally.  He has some mild decrease changes in sensation with some questionable paresthesias bilaterally.  He has no open wounds. [General Appearance - Well-Appearing] : Well appearing [General Appearance - Well Nourished] : well nourished [Oriented To Time, Place, And Person] : Oriented to person, place, and time [Impaired Insight] : Insight and judgment were intact [Affect] : The affect was normal. [Mood] : the mood was normal [Sclera] : the sclera and conjunctiva were normal [Neck Cervical Mass (___cm)] : no neck mass was observed [Heart Rate And Rhythm] : heart rate was normal and rhythm regular [] : No respiratory distress [Abdomen Soft] : Soft [Limping On The Right] : limping on the right [Limping On The Left] : limping on the left [Normal Station and Gait] : gait and station were normal [Tenderness] : tenderness [Skin Changes - Describe changes:] : No skin changes noted [Normal] : Palpable DP and PT pulses, warm and pink with brisk capillary refill [Full ROM Unless otherwise noted:] : Full range of motion unless otherwise noted: [LE  Motor Strength Normal unless otherwise noted:] : 5/5 strength in bilateral lower extemities unless otherwise noted. [2+] : left 2+ [FreeTextEntry7] : Subjective paresthesias

## 2020-11-25 PROBLEM — Z00.00 ENCOUNTER FOR PREVENTIVE HEALTH EXAMINATION: Status: ACTIVE | Noted: 2020-11-25

## 2020-12-10 ENCOUNTER — APPOINTMENT (OUTPATIENT)
Dept: ORTHOPEDIC SURGERY | Facility: HOSPITAL | Age: 70
End: 2020-12-10

## 2020-12-23 ENCOUNTER — APPOINTMENT (OUTPATIENT)
Dept: ORTHOPEDIC SURGERY | Facility: CLINIC | Age: 70
End: 2020-12-23

## 2021-01-07 ENCOUNTER — APPOINTMENT (OUTPATIENT)
Dept: ENDOCRINOLOGY | Facility: CLINIC | Age: 71
End: 2021-01-07

## 2021-03-14 NOTE — ED ADULT NURSE NOTE - PRIMARY CARE PROVIDER
Problem: Falls - Risk of:  Goal: Will remain free from falls  Description: Will remain free from falls  Outcome: Ongoing  Patient ambulated in room, showered, gait steady, did not fall.      Problem: Pain:  Goal: Pain level will decrease  Description: Pain level will decrease  Outcome: Ongoing  Medication given for pain, which decreased pain unk

## 2021-04-14 ENCOUNTER — APPOINTMENT (OUTPATIENT)
Dept: UROLOGY | Facility: CLINIC | Age: 71
End: 2021-04-14

## 2021-05-07 PROBLEM — N18.9 CHRONIC KIDNEY DISEASE, UNSPECIFIED: Chronic | Status: ACTIVE | Noted: 2018-06-01

## 2021-06-28 ENCOUNTER — APPOINTMENT (OUTPATIENT)
Dept: UROLOGY | Facility: CLINIC | Age: 71
End: 2021-06-28

## 2021-07-22 ENCOUNTER — APPOINTMENT (OUTPATIENT)
Dept: ORTHOPEDIC SURGERY | Facility: CLINIC | Age: 71
End: 2021-07-22
Payer: MEDICARE

## 2021-07-22 VITALS — WEIGHT: 230 LBS | BODY MASS INDEX: 34.07 KG/M2 | HEIGHT: 69 IN

## 2021-07-22 DIAGNOSIS — M25.562 PAIN IN RIGHT KNEE: ICD-10-CM

## 2021-07-22 DIAGNOSIS — M25.561 PAIN IN RIGHT KNEE: ICD-10-CM

## 2021-07-22 DIAGNOSIS — G89.29 PAIN IN RIGHT KNEE: ICD-10-CM

## 2021-07-22 PROCEDURE — 99213 OFFICE O/P EST LOW 20 MIN: CPT

## 2021-07-22 PROCEDURE — 72170 X-RAY EXAM OF PELVIS: CPT

## 2021-07-22 PROCEDURE — 99072 ADDL SUPL MATRL&STAF TM PHE: CPT

## 2021-07-22 PROCEDURE — 73562 X-RAY EXAM OF KNEE 3: CPT | Mod: LT

## 2021-07-25 NOTE — DATA REVIEWED
[Imaging Present] : Present [de-identified] : X-rays today AP pelvis shows some mild degenerative changes.  Joint spaces are mostly maintained.  There is some sclerosis seen.  There is minimal osteophyte formation.\par \par X-rays today 3 views of the left knee show the significant degenerative arthrosis with complete loss of joint space medially.  There is also patellofemoral joint arthrosis.  This is progressed into the last x-ray.  There is productive osteophyte formation as well as some subchondral sclerosis.

## 2021-07-25 NOTE — DISCUSSION/SUMMARY
[All Questions Answered] : Patient (and family) had all questions answered to an agreeable level of satisfaction [Interested in Proceeding] : Patient (and family) expressed understanding and interest in proceeding with the plan as outlined [de-identified] : At this point the patient just got steroids for another issue so he is not up for an injection today.  When his sugar settled down if he has no pain relief then he can come back for an injection in the left knee.  He understands he is appropriate for an arthroplasty at any point by radiographs however will wait till his symptoms and his lifestyle warranted.  My recommendations are to see him again for injection or again in 3 to 4 months as necessary.\par \par If imaging was ordered, the patient was told to make an appointment to review findings right after all imaging is completed.\par \par We discussed risks, benefits and alternatives. Rationale of care was reviewed and all questions were answered. Patient (and family) had all questions answered to her degree of the level of satisfaction. Patient (and family) expressed understanding and interest in proceeding with the plan as outlined.\par \par \par \par \par This note was done with a voice recognition transcription software and any typos are related to this rather than medical error. Surgical risks reviewed. Patient (and family) had all questions answered to an agreeable level of satisfaction. Patient (and family) expressed understanding and interest in proceeding with the plan as outlined.  \par

## 2021-07-25 NOTE — HISTORY OF PRESENT ILLNESS
[FreeTextEntry1] : Patient is back today with bilateral knee pain.  I have not seen him in approximately 6 months.  He had an injection in the past which did give him some relief however it made his sugars go up.  He recently just got steroids and so is not up for another injection now.  He still has bilateral knee pain.  He is considering getting an injection because it helped him before.  He is not interested in surgery right now. [Worsening] : worsening [___ mths] : [unfilled] month(s) ago [4] : currently ~his/her~ pain is 4 out of 10

## 2021-07-25 NOTE — PHYSICAL EXAM
[FreeTextEntry1] : On exam the patient stands a good balance.  He has an overall varus/straight left knee.  He has medial more than lateral joint line tenderness.  He also has patellofemoral crepitus and pain.  He stable to varus valgus and anterior testing.  He is correctable with a good endpoint.  He has good bilateral hip range of motion with some mild stiffness and pain.  His leg lengths are equal.  His calves are soft and he is neurovascularly intact. [General Appearance - Well-Appearing] : Well appearing [General Appearance - Well Nourished] : well nourished [Oriented To Time, Place, And Person] : Oriented to person, place, and time [Impaired Insight] : Insight and judgment were intact [Affect] : The affect was normal. [Mood] : the mood was normal [Sclera] : the sclera and conjunctiva were normal [Neck Cervical Mass (___cm)] : no neck mass was observed [Heart Rate And Rhythm] : heart rate was normal and rhythm regular [] : No respiratory distress [Abdomen Soft] : Soft [Normal Station and Gait] : gait and station were normal [Tenderness] : tenderness [Skin Changes - Describe changes:] : No skin changes noted [Normal] : Palpable DP and PT pulses, warm and pink with brisk capillary refill [Full ROM Unless otherwise noted:] : Full range of motion unless otherwise noted: [LE  Motor Strength Normal unless otherwise noted:] : 5/5 strength in bilateral lower extemities unless otherwise noted. [2+] : left 2+ [FreeTextEntry7] : Subjective paresthesias

## 2021-07-26 ENCOUNTER — APPOINTMENT (OUTPATIENT)
Dept: ORTHOPEDIC SURGERY | Facility: CLINIC | Age: 71
End: 2021-07-26

## 2021-11-08 ENCOUNTER — APPOINTMENT (OUTPATIENT)
Dept: ORTHOPEDIC SURGERY | Facility: CLINIC | Age: 71
End: 2021-11-08
Payer: MEDICARE

## 2021-11-08 PROCEDURE — 99213 OFFICE O/P EST LOW 20 MIN: CPT

## 2021-11-08 RX ORDER — HYALURONATE SOD, CROSS-LINKED 30 MG/3 ML
30 SYRINGE (ML) INTRAARTICULAR
Qty: 2 | Refills: 0 | Status: ACTIVE | COMMUNITY
Start: 2021-11-08

## 2021-11-08 NOTE — HISTORY OF PRESENT ILLNESS
[FreeTextEntry1] : Patient still considering getting surgery however he does not want to do it until the spring of next year.  He is still having pain in the area especially the left knee on the medial side.  He is able to walk appropriately.  He occasionally uses a cane.  He has had injections however steroids make his sugars out of control.  His A1c is 6.1 on a regular basis.  He wanted to consider viscosupplementation. [Worsening] : worsening [___ mths] : [unfilled] month(s) ago [4] : currently ~his/her~ pain is 4 out of 10 [Bending] : worsened by bending [Direct Pressure] : worsened by direct pressure [Walking] : worsened by walking [None] : No relieving factors are noted

## 2021-11-08 NOTE — PHYSICAL EXAM
[FreeTextEntry1] : On exam the patient stands a good balance.  He has a antalgic limp because of left knee pain.  He has range of motion of 5degrees to 130 degrees.  He stable to varus valgus testing here is tender over the medial more than lateral joint line tenderness he has patellofemoral crepitus as well.  He has normal strength.  He has no effusions.  His calves are soft and he is neurovascular intact.  He has no right knee pain. [General Appearance - Well-Appearing] : Well appearing [General Appearance - Well Nourished] : well nourished [Oriented To Time, Place, And Person] : Oriented to person, place, and time [Impaired Insight] : Insight and judgment were intact [Affect] : The affect was normal. [Mood] : the mood was normal [Sclera] : the sclera and conjunctiva were normal [Neck Cervical Mass (___cm)] : no neck mass was observed [Heart Rate And Rhythm] : heart rate was normal and rhythm regular [] : No respiratory distress [Abdomen Soft] : Soft [Normal Station and Gait] : gait and station were normal [UE Motor Strength Normal unless otherwise noted:] : 5/5 strength in bilateral upper extremities unless otherwise noted. [Tenderness] : tenderness [Skin Changes - Describe changes:] : No skin changes noted [Normal] : Palpable DP and PT pulses, warm and pink with brisk capillary refill [Full ROM Unless otherwise noted:] : Full range of motion unless otherwise noted: [LE  Motor Strength Normal unless otherwise noted:] : 5/5 strength in bilateral lower extemities unless otherwise noted. [2+] : left 2+ [FreeTextEntry7] : Subjective paresthesias

## 2021-11-08 NOTE — DISCUSSION/SUMMARY
[All Questions Answered] : Patient (and family) had all questions answered to an agreeable level of satisfaction [Interested in Proceeding] : Patient (and family) expressed understanding and interest in proceeding with the plan as outlined [de-identified] : Patient has already tried steroid injections physical therapy and behavior modification.  He still significant pain.  He wants to try viscosupplementation prior to arthroplasty.  I think this is appropriate for him as he has failed everything up to this point.  He may consider arthroplasty in the future however he does not want to right now.  My recommendations are to continue with his behavior modification as well as to see my partner for viscosupplementation.  He has not tried this yet as well do this prior to any arthroplasty consideration. Follow up with Barbra Mancilla\par \par If imaging was ordered, the patient was told to make an appointment to review findings right after all imaging is completed.\par \par We discussed risks, benefits and alternatives. Rationale of care was reviewed and all questions were answered. Patient (and family) had all questions answered to her degree of the level of satisfaction. Patient (and family) expressed understanding and interest in proceeding with the plan as outlined.\par \par \par \par \par This note was done with a voice recognition transcription software and any typos are related to this rather than medical error. Surgical risks reviewed. Patient (and family) had all questions answered to an agreeable level of satisfaction. Patient (and family) expressed understanding and interest in proceeding with the plan as outlined.  \par

## 2021-11-10 RX ORDER — HYALURONATE SODIUM, STABILIZED 60 MG/3 ML
60 SYRINGE (ML) INTRAARTICULAR
Qty: 2 | Refills: 0 | Status: ACTIVE | COMMUNITY
Start: 2021-11-10

## 2021-11-12 ENCOUNTER — APPOINTMENT (OUTPATIENT)
Dept: ORTHOPEDIC SURGERY | Facility: CLINIC | Age: 71
End: 2021-11-12
Payer: MEDICARE

## 2021-11-12 PROCEDURE — 99213 OFFICE O/P EST LOW 20 MIN: CPT | Mod: 25

## 2021-11-12 PROCEDURE — 20610 DRAIN/INJ JOINT/BURSA W/O US: CPT | Mod: LT

## 2021-11-12 NOTE — PHYSICAL EXAM
[de-identified] : Constitutional: Well-nourished, well-developed, No acute distress\par Respiratory:  Good respiratory effort, no SOB\par Lymphatic: No regional lymphadenopathy, no lymphedema\par Psychiatric: Pleasant and normal affect, alert and oriented x3\par Musculoskeletal: normal except where as noted in regional exam\par \par Left Knee:\par APPEARANCE: + enlargement of the distal femur and proximal tibia, no deformity, no swelling\par POSITIVE TENDERNESS:  Distal femur, proximal tibia, medial jt line/retinaculum, and lateral jt line/retinaculum\par NONTENDER: patellar & quadriceps tendons, MCL/LCL, ITB at the lateral femoral condyle & Gerdy's tubercle, pes bursa. \par ROM: full extension, limited flexion to 120° due to stiffness and pain. \par RESISTIVE TESTING: painless resisted knee flex/ext, although + crepitus felt in anterior knee. \par SPECIAL TESTS: stable v/v stress. painless grind. neg ant/post drawer. + Sarita's for pain in medial and lateral joint line. \par

## 2021-11-12 NOTE — DISCUSSION/SUMMARY
[de-identified] : Patient was seen today for evaluation and management of chronic left knee pain secondary to underlying osteoarthritis with recent atraumatic exacerbation.  Patient was seen by one of my orthopedic Associates who recommended hyaluronic acid injection therapy.  Patient has had poor response to cortisone injections in the past, the most recent of which was several years ago, but when he received the injection it elevated his blood sugars so much that he needs to be hospitalized for it.  We discussed various treatment options as well as associated risk/benefits/alternatives and patient elected to proceed with hyaluronic acid injection therapy.  A single Durolane injection was given today under sterile conditions into the Left knee joint without complication (see procedure note). The patient was instructed on modification of activities over the next 48-72 hours. I advised the patient to ice the knee as needed for control of local irritation from the injection. I advised that some patients have immediate benefit from the initial injection therapy, however it usually takes the medication a number of weeks (~8wks) to provide significant relief of osteoarthritic symptoms. If no significant long-term benefit, the patient may elect for additional treatment strategies as previously discussed. However, if the patient obtains good relief of symptoms, the injection therapy series can be repeated over the next 6-12 months.\par \par Will have the patient followup on an as-needed basis at this time.  Patient and spouse appreciate and agree with current plan.\par \par \par This note was generated using dragon medical dictation software.  A reasonable effort has been made for proofreading its contents, but typos may still remain.  If there are any questions or points of clarification needed please notify my office.

## 2021-11-12 NOTE — HISTORY OF PRESENT ILLNESS
[de-identified] : Mr. NAHID LINN is a 71 year male who presents to office complaining of bilateral knee pain (L > R).\par He has dealt with this pain for many years and has most recently been seen by Dr. Boyd Daily for this.\par Patient has tried and ultimately failed conservative treatment, namely, rest, ice, NSAIDs, PT, and cortisone injections (which make his blood sugar levels go up very high).\par Patient deals with worsening constant sharp pain in his knees, aggravated with walking long distances mainly.\par He understands his need for TKRs, but he is not yet ready for them, possibly to be done next year.\par He was referred to be seen today for viscosupplementation injections.\par All review of systems, family history, social history, surgical history, past medical history, medications, and allergies not previously stated as positive are negative. They were reviewed by me today with the patient and documented accordingly.

## 2021-11-12 NOTE — PROCEDURE
[de-identified] : Injection: Left  knee joint.\par Indication: Osteoarthritis.\par \par A discussion was had with the patient regarding this procedure and all questions were answered. All risks, benefits and alternatives were discussed. These included but were not limited to bleeding, infection, and allergic reaction. Alcohol was used to clean the skin, and betadine was used to sterilize and prep the area in the lateral joint line aspect of the knee. Ethyl chloride spray was then used as a topical anesthetic. A 20-gauge needle was used to inject 3 cc of Durolane into the knee with ease. A sterile bandage was then applied. The patient tolerated the procedure well and there were no complications. \par \par Lot Number: [id number]\par Expiration Date: 05-
(3) no apparent problem

## 2022-05-19 RX ORDER — HYALURONATE SOD, CROSS-LINKED 30 MG/3 ML
30 SYRINGE (ML) INTRAARTICULAR
Qty: 1 | Refills: 0 | Status: ACTIVE | COMMUNITY
Start: 2022-05-19

## 2022-05-20 RX ORDER — HYLAN G-F 20 16MG/2ML
48 SYRINGE (ML) INTRAARTICULAR
Qty: 1 | Refills: 0 | Status: ACTIVE | COMMUNITY
Start: 2022-05-20

## 2022-06-02 ENCOUNTER — APPOINTMENT (OUTPATIENT)
Dept: ORTHOPEDIC SURGERY | Facility: CLINIC | Age: 72
End: 2022-06-02

## 2022-06-02 NOTE — ED ADULT TRIAGE NOTE - AS TEMP SITE
[FreeTextEntry1] : JOSE FRANCISCO THOMAS is a 71 y/o post menopausal F who presents with a self palpated RIGHT breast cancer, [id number], ER/FL pos, Her 2 neg, stage IIB AJCC 8th edition. She is s/p  LEFT SSM with sentinel LN bx and RIGHT SSM with axillary dissection on 09/29/21. She is here for her FIRST POST OP VISIT s/p mediport removal on 5/23/22\par \par On physical exam:well healing surgical incision without signs of infection.\par \par \par AS REVIEW\par Her most recent imaging was a b/l dx mammogram and US on 6/17/2021 which revealed the following RIGHT breast findings: \par - In the axilla, @10-11N12, cortically thickened axillary lymph node measuring 2.2 cm --> METASTATIC CARCINOMA\par -C/W palpable concern @10-11N5-8, spiculated mass with associated coarse calcifications, measures 2.7 x 1.2 x 1.8 cm --> IDC\par -@11N10, circumscribed hypoechoic mass, measures 1.3 x 1.2 x 0.4 cm, --> DILATED DUCT\par -@6N5, circumscribed hypoechoic mass measuring 0.3 x 0.3 x 0.2 cm\par -@6N4, circumscribed hypoechoic mass measuring 0.6 x 0.6 x 0.2 cm --> hyalinized fibroadenoma\par \par We discussed fibroadenomas.  These are benign lesions without any malignant potential.  They are hormonally influenced and can increase or decrease in size and can also regress spontaneously.  They are considered proliferative lesions without atypia.  Patients with these lesions have been found to have a slightly increased relative risk of breast cancer compared to the reference population.  Surgical excision is recommended when the diagnosis in unclear, the lesion is causing pain or breast deformity, or if it is rapidly enlarging. \par \par She is currently asymptomatic from her right breast fibroadenoma, so no intervention is indicated for this. \par \par We discussed her new diagnosis of stage IIB RIGHT breast invasive ductal carcinoma (IDC). \par \par She may need systemic chemotherapy given the size of the tumor and positive lymph node.  After speaking with the medical oncologist, we will have her biopsy specimen sent for an ONCOTYPE to determine if she would benefit from chemotherapy. Her ONCOTYPE score was 27, and was recommended for chemotherapy with AC + T, to be given before or after surgery.  After discussion, she has decided to proceed with surgery first.  \par \par OR: RIGHT MODIFIED RADICAL MASTECTOMY, EXCISION OF RIGHT AXILLARY LYMPH NODE WITH RF LOCALIZATION; LEFT MASTECTOMY, SENTINEL LYMPH NODE MAPPING AND BIOPSY, BILATERAL PECTORAL BLOCKS, IMMEDIATE RECONSTRUCTION \par \par We discussed the option of giving chemotherapy before or after surgery.  If given before surgery, this is considered neoadjuvant chemotherapy.  The benefits of undergoing neoadjuvant chemotherapy is that it will provide us with better local control of her breast cancer especially if she has a good pathologic response.  If she has a complete clinical response in her breast and lymph nodes, we may be able to avoid an axillary lymph node dissection which would reduce the morbidity of her surgery.  In addition, it gives us prognostic information regarding her tumor response to chemotherapy.  Patients who have a complete pathologic response (pCR) were found to have an improved prognosis compared to women who do not have a pCR.  \par \par However, prior to starting chemotherapy, and given the locally advanced nature of her disease, I would also like to obtain staging scans with a PET/CT. This revealed an FDG avid R breast mass measures 3.6 x 2 cm and an FDG avid R axillary nodule, measuring 2 cm.  She did not have any other sites of abnormal FDG uptake. \par \par In regards to radiation therapy, depending on her margins, involvement of her pectoralis muscle, and number of lymph nodes positive for disease, she will need post mastectomy radiation.\par \par At the completion of all these therapy, she will need endocrine therapy, likely with an AI as she is post menopausal, which will reduce her rate of recurrence by about 50% to 2/3rds.\par \par Per ASBrS consensus guidelines, any patient with a diagnosis of breast cancer should be offered panel genetic testing as 10% of these patients were found to have a mutation.  THis was offered to her and she would like to proceed with panel genetic testing.  Her blood was drawn today. 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  This is not a pathogenic mutation, however, she should continue to follow up these results, as there is a possibility that this could be re-classified in the future.  She will be referred to Kaye, genetic counselor, following her surgery. \par \par All of her questions were answered.  She knows to call with any further questions or concerns. \par \par PLAN: \par -INVITAE Panel genetic testing -- VUS in DICER1, referral for genetic counselor \par -ONCOTYPE on biopsy specimen -- 27 \par -follow up 2 months for CBE \par -follow up med/onc\par -follow up rad/onc\par \par 
oral

## 2022-06-16 ENCOUNTER — EMERGENCY (EMERGENCY)
Facility: HOSPITAL | Age: 72
LOS: 1 days | Discharge: LEFT BEFORE DISCHARGE | End: 2022-06-16
Attending: EMERGENCY MEDICINE
Payer: MEDICARE

## 2022-06-16 VITALS
HEIGHT: 69 IN | TEMPERATURE: 98 F | SYSTOLIC BLOOD PRESSURE: 129 MMHG | HEART RATE: 82 BPM | DIASTOLIC BLOOD PRESSURE: 82 MMHG | WEIGHT: 213.41 LBS | RESPIRATION RATE: 18 BRPM

## 2022-06-16 VITALS
TEMPERATURE: 99 F | SYSTOLIC BLOOD PRESSURE: 132 MMHG | OXYGEN SATURATION: 95 % | DIASTOLIC BLOOD PRESSURE: 89 MMHG | HEART RATE: 76 BPM | RESPIRATION RATE: 18 BRPM

## 2022-06-16 LAB
ALBUMIN SERPL ELPH-MCNC: 3.2 G/DL — LOW (ref 3.3–5)
ALBUMIN SERPL ELPH-MCNC: 3.2 G/DL — LOW (ref 3.3–5)
ALP SERPL-CCNC: 148 U/L — HIGH (ref 40–120)
ALP SERPL-CCNC: 158 U/L — HIGH (ref 40–120)
ALT FLD-CCNC: 39 U/L — SIGNIFICANT CHANGE UP (ref 10–45)
ALT FLD-CCNC: 45 U/L — SIGNIFICANT CHANGE UP (ref 10–45)
ANION GAP SERPL CALC-SCNC: 10 MMOL/L — SIGNIFICANT CHANGE UP (ref 5–17)
ANION GAP SERPL CALC-SCNC: 11 MMOL/L — SIGNIFICANT CHANGE UP (ref 5–17)
APTT BLD: 27.9 SEC — SIGNIFICANT CHANGE UP (ref 27.5–35.5)
AST SERPL-CCNC: 21 U/L — SIGNIFICANT CHANGE UP (ref 10–40)
AST SERPL-CCNC: 43 U/L — HIGH (ref 10–40)
BASOPHILS # BLD AUTO: 0.07 K/UL — SIGNIFICANT CHANGE UP (ref 0–0.2)
BASOPHILS NFR BLD AUTO: 0.7 % — SIGNIFICANT CHANGE UP (ref 0–2)
BILIRUB SERPL-MCNC: 0.5 MG/DL — SIGNIFICANT CHANGE UP (ref 0.2–1.2)
BILIRUB SERPL-MCNC: 0.5 MG/DL — SIGNIFICANT CHANGE UP (ref 0.2–1.2)
BUN SERPL-MCNC: 33 MG/DL — HIGH (ref 7–23)
BUN SERPL-MCNC: 34 MG/DL — HIGH (ref 7–23)
CALCIUM SERPL-MCNC: 8.8 MG/DL — SIGNIFICANT CHANGE UP (ref 8.4–10.5)
CALCIUM SERPL-MCNC: 9 MG/DL — SIGNIFICANT CHANGE UP (ref 8.4–10.5)
CHLORIDE SERPL-SCNC: 102 MMOL/L — SIGNIFICANT CHANGE UP (ref 96–108)
CHLORIDE SERPL-SCNC: 105 MMOL/L — SIGNIFICANT CHANGE UP (ref 96–108)
CO2 SERPL-SCNC: 22 MMOL/L — SIGNIFICANT CHANGE UP (ref 22–31)
CO2 SERPL-SCNC: 24 MMOL/L — SIGNIFICANT CHANGE UP (ref 22–31)
CREAT SERPL-MCNC: 2.08 MG/DL — HIGH (ref 0.5–1.3)
CREAT SERPL-MCNC: 2.11 MG/DL — HIGH (ref 0.5–1.3)
EGFR: 33 ML/MIN/1.73M2 — LOW
EGFR: 34 ML/MIN/1.73M2 — LOW
EOSINOPHIL # BLD AUTO: 0.22 K/UL — SIGNIFICANT CHANGE UP (ref 0–0.5)
EOSINOPHIL NFR BLD AUTO: 2.3 % — SIGNIFICANT CHANGE UP (ref 0–6)
GLUCOSE SERPL-MCNC: 105 MG/DL — HIGH (ref 70–99)
GLUCOSE SERPL-MCNC: 127 MG/DL — HIGH (ref 70–99)
HCT VFR BLD CALC: 31.4 % — LOW (ref 39–50)
HGB BLD-MCNC: 9.7 G/DL — LOW (ref 13–17)
IMM GRANULOCYTES NFR BLD AUTO: 0.4 % — SIGNIFICANT CHANGE UP (ref 0–1.5)
INR BLD: 1.28 RATIO — HIGH (ref 0.88–1.16)
LYMPHOCYTES # BLD AUTO: 0.93 K/UL — LOW (ref 1–3.3)
LYMPHOCYTES # BLD AUTO: 9.7 % — LOW (ref 13–44)
MCHC RBC-ENTMCNC: 30.6 PG — SIGNIFICANT CHANGE UP (ref 27–34)
MCHC RBC-ENTMCNC: 30.9 GM/DL — LOW (ref 32–36)
MCV RBC AUTO: 99.1 FL — SIGNIFICANT CHANGE UP (ref 80–100)
MONOCYTES # BLD AUTO: 0.69 K/UL — SIGNIFICANT CHANGE UP (ref 0–0.9)
MONOCYTES NFR BLD AUTO: 7.2 % — SIGNIFICANT CHANGE UP (ref 2–14)
NEUTROPHILS # BLD AUTO: 7.6 K/UL — HIGH (ref 1.8–7.4)
NEUTROPHILS NFR BLD AUTO: 79.7 % — HIGH (ref 43–77)
NRBC # BLD: 0 /100 WBCS — SIGNIFICANT CHANGE UP (ref 0–0)
NT-PROBNP SERPL-SCNC: 3251 PG/ML — HIGH (ref 0–300)
PLATELET # BLD AUTO: 226 K/UL — SIGNIFICANT CHANGE UP (ref 150–400)
POTASSIUM SERPL-MCNC: 5.1 MMOL/L — SIGNIFICANT CHANGE UP (ref 3.5–5.3)
POTASSIUM SERPL-MCNC: 5.8 MMOL/L — HIGH (ref 3.5–5.3)
POTASSIUM SERPL-SCNC: 5.1 MMOL/L — SIGNIFICANT CHANGE UP (ref 3.5–5.3)
POTASSIUM SERPL-SCNC: 5.8 MMOL/L — HIGH (ref 3.5–5.3)
PROT SERPL-MCNC: 6.4 G/DL — SIGNIFICANT CHANGE UP (ref 6–8.3)
PROT SERPL-MCNC: 6.8 G/DL — SIGNIFICANT CHANGE UP (ref 6–8.3)
PROTHROM AB SERPL-ACNC: 14.7 SEC — HIGH (ref 10.5–13.4)
RBC # BLD: 3.17 M/UL — LOW (ref 4.2–5.8)
RBC # FLD: 15.4 % — HIGH (ref 10.3–14.5)
SARS-COV-2 RNA SPEC QL NAA+PROBE: SIGNIFICANT CHANGE UP
SODIUM SERPL-SCNC: 136 MMOL/L — SIGNIFICANT CHANGE UP (ref 135–145)
SODIUM SERPL-SCNC: 138 MMOL/L — SIGNIFICANT CHANGE UP (ref 135–145)
TROPONIN T, HIGH SENSITIVITY RESULT: 63 NG/L — HIGH (ref 0–51)
TROPONIN T, HIGH SENSITIVITY RESULT: 65 NG/L — HIGH (ref 0–51)
TROPONIN T, HIGH SENSITIVITY RESULT: 66 NG/L — HIGH (ref 0–51)
WBC # BLD: 9.55 K/UL — SIGNIFICANT CHANGE UP (ref 3.8–10.5)
WBC # FLD AUTO: 9.55 K/UL — SIGNIFICANT CHANGE UP (ref 3.8–10.5)

## 2022-06-16 PROCEDURE — 71275 CT ANGIOGRAPHY CHEST: CPT | Mod: 26,MA

## 2022-06-16 PROCEDURE — 84484 ASSAY OF TROPONIN QUANT: CPT

## 2022-06-16 PROCEDURE — U0005: CPT

## 2022-06-16 PROCEDURE — 99285 EMERGENCY DEPT VISIT HI MDM: CPT | Mod: 25

## 2022-06-16 PROCEDURE — 93308 TTE F-UP OR LMTD: CPT | Mod: 26

## 2022-06-16 PROCEDURE — 85610 PROTHROMBIN TIME: CPT

## 2022-06-16 PROCEDURE — 83880 ASSAY OF NATRIURETIC PEPTIDE: CPT

## 2022-06-16 PROCEDURE — 74174 CTA ABD&PLVS W/CONTRAST: CPT | Mod: MA

## 2022-06-16 PROCEDURE — 74174 CTA ABD&PLVS W/CONTRAST: CPT | Mod: 26,MA

## 2022-06-16 PROCEDURE — 71275 CT ANGIOGRAPHY CHEST: CPT | Mod: MA

## 2022-06-16 PROCEDURE — 85025 COMPLETE CBC W/AUTO DIFF WBC: CPT

## 2022-06-16 PROCEDURE — 80053 COMPREHEN METABOLIC PANEL: CPT

## 2022-06-16 PROCEDURE — 93010 ELECTROCARDIOGRAM REPORT: CPT

## 2022-06-16 PROCEDURE — U0003: CPT

## 2022-06-16 PROCEDURE — 85730 THROMBOPLASTIN TIME PARTIAL: CPT

## 2022-06-16 PROCEDURE — 71046 X-RAY EXAM CHEST 2 VIEWS: CPT | Mod: 26

## 2022-06-16 PROCEDURE — 36415 COLL VENOUS BLD VENIPUNCTURE: CPT

## 2022-06-16 PROCEDURE — 71046 X-RAY EXAM CHEST 2 VIEWS: CPT

## 2022-06-16 PROCEDURE — 93308 TTE F-UP OR LMTD: CPT

## 2022-06-16 RX ORDER — ACETAMINOPHEN 500 MG
1000 TABLET ORAL ONCE
Refills: 0 | Status: COMPLETED | OUTPATIENT
Start: 2022-06-16 | End: 2022-06-16

## 2022-06-16 RX ORDER — LIDOCAINE 4 G/100G
1 CREAM TOPICAL ONCE
Refills: 0 | Status: COMPLETED | OUTPATIENT
Start: 2022-06-16 | End: 2022-06-16

## 2022-06-16 RX ORDER — OXYCODONE HYDROCHLORIDE 5 MG/1
5 TABLET ORAL ONCE
Refills: 0 | Status: DISCONTINUED | OUTPATIENT
Start: 2022-06-16 | End: 2022-06-16

## 2022-06-16 RX ADMIN — OXYCODONE HYDROCHLORIDE 5 MILLIGRAM(S): 5 TABLET ORAL at 18:30

## 2022-06-16 RX ADMIN — OXYCODONE HYDROCHLORIDE 5 MILLIGRAM(S): 5 TABLET ORAL at 18:07

## 2022-06-16 NOTE — ED ADULT NURSE NOTE - OBJECTIVE STATEMENT
6/16/22 1045 70yo M aaox4 h/o DM, HTN, presents ambulatory to ED from home c/o chest pain, as per pt recent had on [facility]: triple bypass on 5/27/22 , Dced home on 06/11/22 on ASA 81mg/daily , yesterday sudden onset L anterior chest pain w/ radiations to the back, , pt reports took Tylenol x1 this am, upon assessment surgical site is no swelling, no s/sx of infection, at this time Pt denies  SOB, HA, vision changes, n/v/d, fevers chills, abdominal pain, weakness,  symptoms Safety and comfort measures initiated- bed placed in lowest position and side rails raised. Pt oriented to call bell system,     ' 6/16/22 1045 72yo M aaox4 h/o DM, HTN, presents ambulatory to ED from home c/o chest pain, as per pt recent had on [facility]: CABG x3 on 5/27/22 , Dced home on 06/11/22 on ASA 81mg/daily , yesterday sudden onset b/l  anterior chest pain w/ radiations to the back, while on rest  , pt reports took Tylenol x1 this am, upon assessment surgical site is no swelling, no s/sx of infection, at this time Pt denies  SOB, HA, vision changes, n/v/d, fevers chills, abdominal pain, weakness,  symptoms Safety and comfort measures initiated- bed placed in lowest position and side rails raised. Pt oriented to call bell system,     '

## 2022-06-16 NOTE — ED ADULT NURSE NOTE - NS ED NOTE  TALK SOMEONE YN
No
Patient brought in to ed for worsening agitation with intermittent sedation, as well as inappropriate behaviors and a functional decline. Patient on interview states he has previously had abd pain ongoing for months, none at this time. Denies any other symptoms. No fevers, ha, neck pain, cp, sob, vomiting, diarrhea, dysuria. Does state occ has difficulty having bms and occasional loose stools-had one today, not watery, with no problem. Patient limited historian. Collateral info obtained as per [facility] note-functional decline - multiple falls, now requiring wheelchair.  exam  GEN - drowsy, arousable to voice; A+O to name/place, answers questions appropriately   HEAD - NC/AT   EYES- PERRL, EOMI  ENT: Airway patent, mmm, Oral cavity and pharynx normal. No inflammation, swelling, exudate, or lesions.  NECK: Neck supple, no masses.  PULMONARY - CTA b/l, symmetric breath sounds.   CARDIAC -s1s2, RRR, no M,G,R  ABDOMEN - +BS, ND, NT, soft, no guarding, no rebound, no masses   BACK - no CVA tenderness, Normal  spine   EXTREMITIES - FROM, symmetric pulses, capillary refill < 2 seconds, no edema   SKIN - no rash, scattered ecchymoses diffusely in multiple stages of healing.  NEUROLOGIC - alert, speech clear, moving all extremities equally, sensation grossly intact, face symmetric, limited by mental status.  PSYCH -calm/cooperative in ed, denies si/hi  patient presents to ed for worsening functional decline, agitation, with intermittent sedation possibly from meds given at facility to calm him-scattered bruising, no deformities, abd nontender, drowsy but alert and cooperative in ed with no focal neuro deficits-will need  admission given functional decline with possible ms change due to meds, labs to eval for met disturbance, ua, ct head to eval for ich.

## 2022-06-16 NOTE — ED ADULT NURSE REASSESSMENT NOTE - NS ED NURSE REASSESS COMMENT FT1
Patient signed out of ED AMA. MD explained risks of signing out AMA to patient. Patient verbalized understanding of risks. Pt A&Ox3. VSS. IV d/c. Patient ambulated out of ED well, steady gait, unassisted.

## 2022-06-16 NOTE — CONSULT NOTE ADULT - SUBJECTIVE AND OBJECTIVE BOX
NEPHROLOGY - NSN    Patient seen and examined.    HPI:  Pt presents for chest pain and recent bypass at [facility](less then 30 days ago)   During surgery he did go into josee and now at baseline   Had CT with contrast and there was no dissection   Patient has had diabetes for about 20 years. No evidence of retinopathy. He has had hypertension for about 18 years. No history of strokes. He has had gout in his toes. He has no prior history of abnormal kidney function. He denies any bubbles in his urine or hematuria. He has no family history of any kidney disease. He denies any back pain. He urinates once or twice in the night time and states that he has no contrast procedures or recent antibiotics. The patient did have a history of alcoholic abuse but reduced intake but still drinks . At one time he was told that he has stone in liver but that has now subsequently gotten bet    PAST MEDICAL & SURGICAL HISTORY:  HTN (hypertension)      HLD (hyperlipidemia)      DM (diabetes mellitus)      Gout      CKD (chronic kidney disease)      No significant past surgical history          MEDICATIONS  (STANDING):      Allergies    No Known Allergies    Intolerances        SOCIAL HISTORY:  Denies alcohol abuse, drug abuse or tobacco usage.     FAMILY HISTORY:      VITALS:  T(C): 37.3 (06-16-22 @ 11:43), Max: 37.3 (06-16-22 @ 11:43)  HR: 78 (06-16-22 @ 16:13) (78 - 82)  BP: 137/67 (06-16-22 @ 16:13) (124/58 - 137/67)  RR: 18 (06-16-22 @ 16:13) (18 - 18)  SpO2: 98% (06-16-22 @ 16:13) (98% - 99%)    REVIEW OF SYSTEMS:   + chest pain. Good oral intake and denies fatigue or weakness. All other pertinent systems are reviewed and are negative.    PHYSICAL EXAM:  Constitutional: NAD  HEENT: EOMI  Neck:  No JVD, supple   Respiratory: CTA B/L  Cardiovascular: S1 and S2, RRR  Gastrointestinal: + BS, soft, NT, ND  Extremities: No peripheral edema, + peripheral pulses  Neurological: A/O x 3, CN2-12 intact  Psychiatric: Normal mood, normal affect  : No Isidro  Skin: No rashes, C/D/I  Access: Not applicable    I and O's:    Height (cm): 175.3 (06-16 @ 11:01)  Weight (kg): 96.8 (06-16 @ 11:01)  BMI (kg/m2): 31.5 (06-16 @ 11:01)  BSA (m2): 2.12 (06-16 @ 11:01)    LABS:                        9.7    9.55  )-----------( 226      ( 16 Jun 2022 12:19 )             31.4     06-16    136  |  102  |  33<H>  ----------------------------<  105<H>  5.1   |  24  |  2.08<H>    Ca    8.8      16 Jun 2022 14:20    TPro  6.4  /  Alb  3.2<L>  /  TBili  0.5  /  DBili  x   /  AST  21  /  ALT  39  /  AlkPhos  148<H>  06-16      URINE:      RADIOLOGY & ADDITIONAL STUDIES:    < from: CT Angio Abdomen and Pelvis w/ IV Cont (06.16.22 @ 12:36) >    ACC: [id number] EXAM:  CT ANGIO ABD PELV (W)AW IC                        ACC: [id number] EXAM:  CT ANGIO CHEST AORTA [facility]                          PROCEDURE DATE:  06/16/2022          INTERPRETATION:  INDICATION: Chest pain    Technique: Angiogram of the chest, abdomen and pelvis before and after   the intravenous administration of Omnipaque 350. Three-dimensional images   were analyzed.    COMPARISON: 1/6/2018    FINDINGS:    ANGIOGRAM: No dissection, intramural hematoma, or penetrating   atherosclerotic ulcer. Patent arch vessels and aortic branches in the   abdomen and pelvis. No aortic aneurysm.    Normal heart size. No pericardial effusion. Coronary artery disease with   CABG.        CHEST:    LUNGS/AIRWAYS/PLEURA: Patent trachea and bronchi. Small left pleural   effusion and mild passive atelectasis of the left lower lobe. No   pneumothorax.    LYMPH NODES/MEDIASTINUM: No enlarged lymph nodes. Multiple subcentimeter   thyroid nodules. Mild postsurgical fluid posterior to the sternum.          ABDOMEN/PELVIS:    LIVER: Unremarkable.    BILIARY SYSTEM: Unremarkable.    SPLEEN:Unremarkable.    PANCREAS: Unremarkable.    ADRENALS: Unremarkable.    KIDNEYS/URINARY TRACT: 1.7 cm hypoattenuating lesion in the left mid   kidney disease (), prior 1.3 cm. Stable too small to characterize   hypodensities in the right kidney.    GASTROINTESTINAL TRACT: The some of the descending colon is excluded from   the field-of-view. Diverticulosis.    REPRODUCTIVE ORGANS: Unremarkable.    LYMPH NODES/PERITONEUM/RETROPERITONEUM: Lateral aspects of the right and   left hemiabdomen that are excluded from the field-of-view. Otherwise,   unremarkable.        BONES/SOFT TISSUES: Status post sternotomy. Degenerative changes of the   spine. Acuteor subacute minimally displaced fracture of the left third   rib.      IMPRESSION:    No acute aortic syndrome.    Acute or subacute minimally displaced fracture of the left second rib.    Small left pleural effusion.    Increased 1.7 cm lesion in the left mid kidney, suspicious for neoplasm.   Further evaluation with contrast-enhanced MRI is recommended.    --- End of Report ---            CINTHYA PATTON M.D., ATTENDING RADIOGIST  This document has been electronically signed. Jun 16 2022  1:02PM    < end of copied text >   NEPHROLOGY - NSN    Patient seen and examined.    HPI:  Pt presents for chest pain and recent bypass at [facility](less then 30 days ago)   During surgery he did go into josee and now at baseline   Had CT with contrast and there was no dissection   Patient has had diabetes for about 20 years. No evidence of retinopathy. He has had hypertension for about 18 years. No history of strokes. He has had gout in his toes. He has no prior history of abnormal kidney function. He denies any bubbles in his urine or hematuria. He has no family history of any kidney disease. He denies any back pain. He urinates once or twice in the night time and states that he has no contrast procedures or recent antibiotics. The patient did have a history of alcoholic abuse    At present chest pain free     PAST MEDICAL & SURGICAL HISTORY:  HTN (hypertension)      HLD (hyperlipidemia)      DM (diabetes mellitus)      Gout      CKD (chronic kidney disease)      No significant past surgical history          MEDICATIONS  (STANDING):      Allergies    No Known Allergies    Intolerances        SOCIAL HISTORY:  Denies alcohol abuse, drug abuse or tobacco usage.     FAMILY HISTORY:      VITALS:  T(C): 37.3 (06-16-22 @ 11:43), Max: 37.3 (06-16-22 @ 11:43)  HR: 78 (06-16-22 @ 16:13) (78 - 82)  BP: 137/67 (06-16-22 @ 16:13) (124/58 - 137/67)  RR: 18 (06-16-22 @ 16:13) (18 - 18)  SpO2: 98% (06-16-22 @ 16:13) (98% - 99%)    REVIEW OF SYSTEMS:   + chest pain. Good oral intake and denies fatigue or weakness. All other pertinent systems are reviewed and are negative.    PHYSICAL EXAM:  Constitutional: NAD  HEENT: EOMI  Neck:  No JVD, supple   Respiratory: CTA B/L  Cardiovascular: S1 and S2, RRR  Gastrointestinal: + BS, soft, NT, ND  Extremities: No peripheral edema, + peripheral pulses  Neurological: A/O x 3, CN2-12 intact  Psychiatric: Normal mood, normal affect  : No Isidro  Skin: No rashes, C/D/I  Access: Not applicable    I and O's:    Height (cm): 175.3 (06-16 @ 11:01)  Weight (kg): 96.8 (06-16 @ 11:01)  BMI (kg/m2): 31.5 (06-16 @ 11:01)  BSA (m2): 2.12 (06-16 @ 11:01)    LABS:                        9.7    9.55  )-----------( 226      ( 16 Jun 2022 12:19 )             31.4     06-16    136  |  102  |  33<H>  ----------------------------<  105<H>  5.1   |  24  |  2.08<H>    Ca    8.8      16 Jun 2022 14:20    TPro  6.4  /  Alb  3.2<L>  /  TBili  0.5  /  DBili  x   /  AST  21  /  ALT  39  /  AlkPhos  148<H>  06-16      URINE:      RADIOLOGY & ADDITIONAL STUDIES:    < from: CT Angio Abdomen and Pelvis w/ IV Cont (06.16.22 @ 12:36) >    ACC: [id number] EXAM:  CT ANGIO ABD PELV (W)AW IC                        ACC: [id number] EXAM:  CT ANGIO CHEST AORTA [facility]                          PROCEDURE DATE:  06/16/2022          INTERPRETATION:  INDICATION: Chest pain    Technique: Angiogram of the chest, abdomen and pelvis before and after   the intravenous administration of Omnipaque 350. Three-dimensional images   were analyzed.    COMPARISON: 1/6/2018    FINDINGS:    ANGIOGRAM: No dissection, intramural hematoma, or penetrating   atherosclerotic ulcer. Patent arch vessels and aortic branches in the   abdomen and pelvis. No aortic aneurysm.    Normal heart size. No pericardial effusion. Coronary artery disease with   CABG.        CHEST:    LUNGS/AIRWAYS/PLEURA: Patent trachea and bronchi. Small left pleural   effusion and mild passive atelectasis of the left lower lobe. No   pneumothorax.    LYMPH NODES/MEDIASTINUM: No enlarged lymph nodes. Multiple subcentimeter   thyroid nodules. Mild postsurgical fluid posterior to the sternum.          ABDOMEN/PELVIS:    LIVER: Unremarkable.    BILIARY SYSTEM: Unremarkable.    SPLEEN:Unremarkable.    PANCREAS: Unremarkable.    ADRENALS: Unremarkable.    KIDNEYS/URINARY TRACT: 1.7 cm hypoattenuating lesion in the left mid   kidney disease (), prior 1.3 cm. Stable too small to characterize   hypodensities in the right kidney.    GASTROINTESTINAL TRACT: The some of the descending colon is excluded from   the field-of-view. Diverticulosis.    REPRODUCTIVE ORGANS: Unremarkable.    LYMPH NODES/PERITONEUM/RETROPERITONEUM: Lateral aspects of the right and   left hemiabdomen that are excluded from the field-of-view. Otherwise,   unremarkable.        BONES/SOFT TISSUES: Status post sternotomy. Degenerative changes of the   spine. Acuteor subacute minimally displaced fracture of the left third   rib.      IMPRESSION:    No acute aortic syndrome.    Acute or subacute minimally displaced fracture of the left second rib.    Small left pleural effusion.    Increased 1.7 cm lesion in the left mid kidney, suspicious for neoplasm.   Further evaluation with contrast-enhanced MRI is recommended.    --- End of Report ---            CINTHYA PATTON M.D., ATTENDING RADIOGIST  This document has been electronically signed. Jun 16 2022  1:02PM    < end of copied text >

## 2022-06-16 NOTE — ED PROVIDER NOTE - PROGRESS NOTE DETAILS
Jefferson Blanco D.O., PGY3 (Resident)  CT nonactionable. Discussed results of rib fracture, 1.7cm L kidney lesion. Discussed aorta w/o actionable pathology. hgb 9.7. Hemodynamically stable. NAD. Family and patient would like transfer to [facility]. Transfer center called and case discussed with [facility] transfer center. Pending call back from CT surgeon from [facility]. Patient offered pain meds other than tylenol as still not 6 hrs later and unable to give antiinflam 2/2 increased bleeding risk. Patient offered opiates but declined. After multiple declines, patient accepted oral oxycodone. Ordered. Family updated. All questions answered. Jefferson Blanco D.O., PGY3 (Resident)  Another call placed to transfer center. Still waiting on [facility] CT surg. Discussed with family. Understandably upset the time delay but will continue to monitor and tx pain while here. Sumit, PGY-2  The patient wishes to be discharged against medical advice. I have assessed the patient's mental status and the patient has capacity to make this decision. I have explained the risks of leaving without full treatment, including severe disability and death, which the patient understands and is willing to accept. I have answered all of the patient's questions. I reiterated my medical opinion and advised the patient to return at any time. We discussed the further workup outside of the current visit and return precautions. Patient will follow up with [facility] docs Sumit, PGY-2  The patient wishes to be discharged against medical advice. Hemodynamically stable. I have assessed the patient's mental status and the patient has capacity to make this decision. I have explained the risks of leaving without full treatment, including severe disability and death, which the patient understands and is willing to accept. I have answered all of the patient's questions. I reiterated my medical opinion and advised the patient to return at any time. We discussed the further workup outside of the current visit and return precautions. Patient will follow up with [facility] docs

## 2022-06-16 NOTE — ED PROVIDER NOTE - NSICDXPASTMEDICALHX_GEN_ALL_CORE_FT
PAST MEDICAL HISTORY:  CKD (chronic kidney disease)     DM (diabetes mellitus)     Gout     HLD (hyperlipidemia)     HTN (hypertension)

## 2022-06-16 NOTE — ED PROCEDURE NOTE - US CPT CODES
68563 US Chest (PTX, Pleural Effussion/CHF vs COPD)/71815 Echocardiography Transthoracic with Image 2D (Echo/FAST)

## 2022-06-16 NOTE — CONSULT NOTE ADULT - ASSESSMENT
Elderly male c hx HTN  and DM and CAD sp open heart less then 30 days ago pw chest pain   CKD stage 4     1 Renal - Will need to trend serum creatinine for AKIN but historically has done well with contrast   At some point quantify proteinuria   2 CVS-Discussion to transfer to [facility];  Does he need a stress vs cath;  Defer to cards   3 CTS-No evidence of dissection     DW ER  DW WIfe     Sayed [facility]   [employer] [facility]   [phone]

## 2022-06-16 NOTE — ED PROVIDER NOTE - NS ED ROS FT
Gen: Denies fever  CV: Denies palpitations  Skin: Denies rash, erythema, color changes  Resp: Denies SOB,  Endo: Denies sensitivity to heat, cold  GI: Denies diarrhea, constipation, nausea, vomiting  Msk: Denies extremity pain  : Denies dysuria, increased frequency  Neuro: Denies LOC, weakness, numbness/tingling

## 2022-06-16 NOTE — ED PROVIDER NOTE - CARE PLAN
1 Principal Discharge DX:	Chest pain, unspecified  Secondary Diagnosis:	Renal lesion  Secondary Diagnosis:	Pleural effusion

## 2022-06-16 NOTE — ED PROVIDER NOTE - PHYSICAL EXAMINATION
Gen: Elevated BMI, NAD, comfortable appearing   HEENT: EOMI, no nasal discharge, mucous membranes moist, no oropharyngeal edema/erythema/exudates   CV: RRR, +S1/S2, no M/R/G, difficult to palpate radial pulses b/l with normal DPs/PTs, equal SBPs b/l (within <15mmHg), midline surgical site c/d/i   Resp: CTAB, no W/R/R, SPO2 >95% on RA, no increased WOB   GI: Abdomen soft non-distended, NTTP, no masses/organomegaly   MSK/Skin: B/l symmetric LE pitting edema, no CVA tenderness, no open wounds, no bruising  Neuro: CN2-12 grossly intact, A&Ox4, MS +5/5 in UE and LE BL, gross sensation intact in UE and LE BL  Psych: appropriate mood Gen: Elevated BMI, NAD, comfortable appearing   HEENT: EOMI, no nasal discharge, mucous membranes moist, no oropharyngeal edema/erythema/exudates   CV: RRR, +S1/S2, no M/R/G, difficult to palpate radial pulses b/l with normal DPs/PTs, equal SBPs b/l (within <15mmHg), midline surgical site c/d/i   Resp: CTAB, no W/R/R, SPO2 >95% on RA, no increased WOB   GI: Abdomen soft non-distended, NTTP, no masses/organomegaly   MSK/Skin: B/l symmetric LE pitting edema, no CVA tenderness, no open wounds, no bruising  Neuro: CN2-12 grossly intact, A&Ox4, MS +5/5 in UE and LE BL, gross sensation intact in UE and LE BL  Psych: appropriate mood    Attn - alert, NAD, no pallor or jaundice, PERRL 3 mm, moist mm, skin - warm and dry, Lungs - clear, no w/r/r, good BS bilaterally, Cor - rr, no M, no rub, Chest - sternotomy incision clear and dry, min tenderness, no signs of infection.  Abdo - ND, soft, NT, no HSM, no CVAT, no guarding or rebound. Extremities - no edema, no calf tenderness, distal pulses intact and symmetrical, Neuro - intact and non-focal

## 2022-06-16 NOTE — ED PROVIDER NOTE - NSFOLLOWUPINSTRUCTIONS_ED_ALL_ED_FT
You were seen today in the emergency room for chest pain and recommended to be transferred to [facility] where your surgery was performed but you requested to leave against medical advise. Your pain could still represent a problem with your heart given your recent surgery. You need to follow up with your doctor and/or a cardiologist in the next 24 hours. If you develop any new or worsening symptoms you need to return immediately to the emergency department. If you experience any of the following please come right back to the emergency room: chest pain that becomes much worse with walking up stairs or exercising, uncontrollable nausea and vomiting, severe chest pain that will not go away, passing out, new persistent numbness and/or weakness. You should avoid taking medications such as ibuprofen, Motrin, Advil or other NSAIDs until you speak with your doctor about your pain.

## 2022-06-16 NOTE — ED PROVIDER NOTE - OBJECTIVE STATEMENT
72 y/o male with pmhx of DM, HTN, CAD s/p CABG x 3 (May 27th) recently dced on June 1tth after CABG presenting with chest pain x 2 days. B/l chest pain radiating to back, started yesterday while resting, worse with exertion with no associated LOC, dizziness, palpitations, diaphoresis, weakness, numbness/tingling, fevers/chills, n/v/d, rashes. Intermittent non-productive cough. Denies worsening LE swelling/leg pain, hemoptysis, hx of blood clots, but recently had prolonged hospital stay. 70 y/o male with pmhx of DM, HTN, CAD s/p CABG x 3 (May 27th) recently dced on June 1tth after CABG presenting with chest pain x 2 days. B/l chest pain radiating to back, started yesterday while resting, worse with exertion with no associated LOC, dizziness, palpitations, diaphoresis, weakness, numbness/tingling, fevers/chills, n/v/d, rashes. Intermittent non-productive cough. Denies worsening LE swelling/leg pain, hemoptysis, hx of blood clots, but recently had prolonged hospital stay.    Attn - pt seen in [id number] - agree with above - pt s/p CABG 3v at [facility] on 5/27 and was improving until yesterday c/o band like pressure across chest with radiation to upper back yesterday.  no assoc symptom, no fever, no lightheadedness, no palp, no sob.  no abdo pain.  incisions clean and dry

## 2022-06-16 NOTE — ED PROVIDER NOTE - PATIENT PORTAL LINK FT
You can access the FollowMyHealth Patient Portal offered by [facility] by registering at the following website: [email or web address]. By joining [employer]’s FollowMyHealth portal, you will also be able to view your health information using other applications (apps) compatible with our system.

## 2022-06-16 NOTE — ED PROVIDER NOTE - CLINICAL SUMMARY MEDICAL DECISION MAKING FREE TEXT BOX
70 y/o male with pmhx of DM, HTN, CAD s/p CABG x 3 (May 27th) recently dced on June 1tth after CABG presenting with chest pain x 2 days, radiating to back, worse with exertion, no associated neurological/infectious symptoms, hemodynamically stable with weak radial pulses b/l but equal SBPs in b/l UE and good DP pulses, no FND, well appearing. CTA chest for suspicion of coronary artery dissection vs. aortic dissection vs. graft occlusion vs. pericardial effusion vs. low suspicion of ACS vs. CHF: trop/pro-BNP; EKG with no ischemic changes but low voltage; echo for suspicion of effusion 72 y/o male with pmhx of DM, HTN, CAD s/p CABG x 3 (May 27th) recently dced on June 1tth after CABG presenting with chest pain x 2 days, radiating to back, worse with exertion, no associated neurological/infectious symptoms, hemodynamically stable with weak radial pulses b/l but equal SBPs in b/l UE and good DP pulses, no FND, well appearing. CTA chest for suspicion of coronary artery dissection vs. aortic dissection vs. graft occlusion vs. pericardial effusion vs. low suspicion of ACS vs. CHF: trop/pro-BNP; EKG with no ischemic changes but low voltage; echo for suspicion of effusion    Attn - s/p 3v CABG 5/27 with bandlike pain with radiation to back - agree with DDx and management.  [facility] called about pt and would like to have pt back if  appropriate.

## 2022-06-17 NOTE — ED POST DISCHARGE NOTE - RESULT SUMMARY
CT: increase in size of L kidney lesion suspicious for neoplasm. ED team documented these results were discussed with pt. no need for callback at this time - Flash Leiva PA-C

## 2022-06-29 ENCOUNTER — NON-APPOINTMENT (OUTPATIENT)
Age: 72
End: 2022-06-29

## 2022-06-30 ENCOUNTER — APPOINTMENT (OUTPATIENT)
Dept: ORTHOPEDIC SURGERY | Facility: CLINIC | Age: 72
End: 2022-06-30

## 2022-07-07 ENCOUNTER — APPOINTMENT (OUTPATIENT)
Dept: ORTHOPEDIC SURGERY | Facility: CLINIC | Age: 72
End: 2022-07-07

## 2022-08-05 ENCOUNTER — APPOINTMENT (OUTPATIENT)
Dept: ORTHOPEDIC SURGERY | Facility: CLINIC | Age: 72
End: 2022-08-05

## 2022-08-05 DIAGNOSIS — M17.12 UNILATERAL PRIMARY OSTEOARTHRITIS, LEFT KNEE: ICD-10-CM

## 2022-08-05 PROCEDURE — 99214 OFFICE O/P EST MOD 30 MIN: CPT | Mod: 25

## 2022-08-05 PROCEDURE — 20610 DRAIN/INJ JOINT/BURSA W/O US: CPT | Mod: LT

## 2022-08-05 RX ORDER — LOSARTAN POTASSIUM 100 MG/1
100 TABLET, FILM COATED ORAL
Qty: 90 | Refills: 0 | Status: ACTIVE | COMMUNITY
Start: 2022-05-25

## 2022-08-05 RX ORDER — FAMOTIDINE 20 MG/1
20 TABLET, FILM COATED ORAL
Qty: 30 | Refills: 0 | Status: ACTIVE | COMMUNITY
Start: 2022-07-16

## 2022-08-05 RX ORDER — LACTULOSE 10 G/15ML
10 SOLUTION ORAL
Qty: 473 | Refills: 0 | Status: ACTIVE | COMMUNITY
Start: 2022-07-18

## 2022-08-05 RX ORDER — TAMSULOSIN HYDROCHLORIDE 0.4 MG/1
0.4 CAPSULE ORAL
Qty: 30 | Refills: 0 | Status: ACTIVE | COMMUNITY
Start: 2022-06-11

## 2022-08-05 RX ORDER — BLOOD SUGAR DIAGNOSTIC
STRIP MISCELLANEOUS
Qty: 300 | Refills: 0 | Status: ACTIVE | COMMUNITY
Start: 2021-10-14

## 2022-08-05 RX ORDER — DOCUSATE SODIUM 100 MG/1
100 CAPSULE, LIQUID FILLED ORAL
Qty: 30 | Refills: 0 | Status: ACTIVE | COMMUNITY
Start: 2022-07-18

## 2022-08-05 RX ORDER — BLOOD-GLUCOSE METER
W/DEVICE EACH MISCELLANEOUS
Qty: 1 | Refills: 0 | Status: ACTIVE | COMMUNITY
Start: 2022-05-25

## 2022-08-05 RX ORDER — ALLOPURINOL 100 MG/1
100 TABLET ORAL
Qty: 180 | Refills: 0 | Status: ACTIVE | COMMUNITY
Start: 2021-10-14

## 2022-08-05 RX ORDER — METOPROLOL SUCCINATE 50 MG/1
50 TABLET, EXTENDED RELEASE ORAL
Qty: 90 | Refills: 0 | Status: ACTIVE | COMMUNITY
Start: 2021-11-15

## 2022-08-05 RX ORDER — FUROSEMIDE 40 MG/1
40 TABLET ORAL
Qty: 90 | Refills: 0 | Status: ACTIVE | COMMUNITY
Start: 2022-05-25

## 2022-08-05 RX ORDER — METOPROLOL TARTRATE 25 MG/1
25 TABLET, FILM COATED ORAL
Qty: 60 | Refills: 0 | Status: ACTIVE | COMMUNITY
Start: 2022-06-11

## 2022-08-05 RX ORDER — SULFAMETHOXAZOLE AND TRIMETHOPRIM 800; 160 MG/1; MG/1
800-160 TABLET ORAL
Qty: 20 | Refills: 0 | Status: ACTIVE | COMMUNITY
Start: 2022-06-28

## 2022-08-05 RX ORDER — ASPIRIN 81 MG/1
81 TABLET, COATED ORAL
Qty: 90 | Refills: 0 | Status: ACTIVE | COMMUNITY
Start: 2022-05-25

## 2022-08-05 RX ORDER — INSULIN LISPRO 100 [IU]/ML
(75-25) 100 INJECTION, SUSPENSION SUBCUTANEOUS
Qty: 30 | Refills: 0 | Status: ACTIVE | COMMUNITY
Start: 2021-10-14

## 2022-08-05 RX ORDER — AMLODIPINE BESYLATE 5 MG/1
5 TABLET ORAL
Qty: 90 | Refills: 0 | Status: ACTIVE | COMMUNITY
Start: 2022-06-14

## 2022-08-05 RX ORDER — ACETAMINOPHEN AND CODEINE 300; 30 MG/1; MG/1
300-30 TABLET ORAL
Qty: 30 | Refills: 0 | Status: ACTIVE | COMMUNITY
Start: 2022-07-01

## 2022-08-05 RX ORDER — FOLIC ACID 1 MG/1
1 TABLET ORAL
Qty: 90 | Refills: 0 | Status: ACTIVE | COMMUNITY
Start: 2022-06-18

## 2022-08-05 RX ORDER — OXYCODONE 5 MG/1
5 TABLET ORAL
Qty: 28 | Refills: 0 | Status: ACTIVE | COMMUNITY
Start: 2022-06-17

## 2022-08-05 RX ORDER — METOPROLOL SUCCINATE 25 MG/1
25 TABLET, EXTENDED RELEASE ORAL
Qty: 30 | Refills: 0 | Status: ACTIVE | COMMUNITY
Start: 2022-07-18

## 2022-08-05 RX ORDER — SENNOSIDES 8.6 MG/1
8.6 TABLET, COATED ORAL
Qty: 60 | Refills: 0 | Status: ACTIVE | COMMUNITY
Start: 2022-06-18

## 2022-08-05 RX ORDER — ACETAMINOPHEN 325 MG/1
325 TABLET ORAL
Qty: 30 | Refills: 0 | Status: ACTIVE | COMMUNITY
Start: 2022-06-11

## 2022-08-05 RX ORDER — PEN NEEDLE, DIABETIC 32GX 5/32"
32G X 4 MM NEEDLE, DISPOSABLE MISCELLANEOUS
Qty: 200 | Refills: 0 | Status: ACTIVE | COMMUNITY
Start: 2021-08-11

## 2022-08-08 NOTE — PROCEDURE
[de-identified] : Injection: Left knee joint.\par Indication: Osteoarthritis.\par \par A discussion was had with the patient regarding this procedure and all questions were answered. All risks, benefits and alternatives were discussed. These included but were not limited to bleeding, infection, and allergic reaction. A timeout was done to ensure correct side and patient agreed to the procedure.  A [place] ban was created on the skin utilizing a plastic needle cap to ban the anticipated point of entry. Alcohol was used to clean the skin, and betadine was used to sterilize and prep the area in the lateral joint line aspect of the knee. Ethyl chloride spray was then used as a topical anesthetic. A 20-gauge needle was used to inject Synvisc One into the knee with ease. A sterile bandage was then applied. The patient tolerated the procedure well and there were no complications. \par \par Lot #:  [id number]\par Exp:  3/31/25

## 2022-08-08 NOTE — PHYSICAL EXAM
[de-identified] : Constitutional: Well-nourished, well-developed, No acute distress\par Respiratory: Good respiratory effort, no SOB\par Lymphatic: No regional lymphadenopathy, no lymphedema\par Psychiatric: Pleasant and normal affect, alert and oriented x3\par Musculoskeletal: normal except where as noted in regional exam\par \par Left Knee:\par APPEARANCE: + enlargement of the distal femur and proximal tibia, no deformity, no swelling\par POSITIVE TENDERNESS: Distal femur, proximal tibia, medial jt line/retinaculum, and lateral jt line/retinaculum\par NONTENDER: patellar & quadriceps tendons, MCL/LCL, ITB at the lateral femoral condyle & Gerdy's tubercle, pes bursa. \par ROM: full extension, limited flexion to 120° due to stiffness and pain. \par RESISTIVE TESTING: painless resisted knee flex/ext, although + crepitus felt in anterior knee. \par SPECIAL TESTS: stable v/v stress. painless grind. neg ant/post drawer. + Sarita's for pain in medial and lateral joint line. \par \par

## 2022-08-08 NOTE — DISCUSSION/SUMMARY
[de-identified] : Patient was seen today for continued management of chronic knee pain secondary to underlying osteoarthritis. This has been a chronic issue for the patient with recent atraumatic exacerbation. We discussed various treatment options as well as associated risk/benefits/alternatives and patient elected to proceed with hyaluronic acid injection therapy. \par A single Synvisc One injection was given today under sterile conditions into the left knee joint without complication (see procedure note). I discussed the effects of this medication and how long it may provide benefit. If no significant long-term benefit, the patient may elect for additional treatment strategies as previously discussed. However, if the patient obtains good relief of symptoms, the injection therapy series can be repeated over the next 6-12 months.\par \par Will have the patient followup on an as-needed basis at this time.  Patient appreciates and agrees with current plan.\par \par I work as part of an academic orthopedic group and routinely have a physician in training (resident / fellow) working with me.  Any part of the history and physical exam performed by the physician in training was either directly reviewed and/or replicated by myself.  Any procedure performed by the physician in training was performed under my direct supervision and with the consent of the patient.\par \par This note was generated using dragon medical dictation software. A reasonable effort has been made for proofreading its contents, but typos may still remain. If there are any questions or points of clarification needed please notify my office.

## 2022-08-08 NOTE — HISTORY OF PRESENT ILLNESS
[de-identified] : Patient has a history of knee osteoarthritis.  We discussed treatment options and have obtained insurance authorization for a hyaluronic acid injection and patient would like to proceed at this time.  No significant interval change in knee pain since last evaluated.

## 2023-04-16 ENCOUNTER — INPATIENT (INPATIENT)
Facility: HOSPITAL | Age: 73
LOS: 0 days | Discharge: LEFT BEFORE DISCHARGE | End: 2023-04-16
Attending: INTERNAL MEDICINE | Admitting: INTERNAL MEDICINE
Payer: MEDICARE

## 2023-04-16 VITALS
RESPIRATION RATE: 20 BRPM | DIASTOLIC BLOOD PRESSURE: 80 MMHG | OXYGEN SATURATION: 100 % | TEMPERATURE: 98 F | HEART RATE: 88 BPM | SYSTOLIC BLOOD PRESSURE: 154 MMHG

## 2023-04-16 VITALS
OXYGEN SATURATION: 99 % | SYSTOLIC BLOOD PRESSURE: 159 MMHG | TEMPERATURE: 98 F | HEART RATE: 70 BPM | RESPIRATION RATE: 18 BRPM | DIASTOLIC BLOOD PRESSURE: 78 MMHG

## 2023-04-16 DIAGNOSIS — N17.9 ACUTE KIDNEY FAILURE, UNSPECIFIED: ICD-10-CM

## 2023-04-16 DIAGNOSIS — M79.602 PAIN IN LEFT ARM: ICD-10-CM

## 2023-04-16 DIAGNOSIS — R19.7 DIARRHEA, UNSPECIFIED: ICD-10-CM

## 2023-04-16 DIAGNOSIS — I25.10 ATHEROSCLEROTIC HEART DISEASE OF NATIVE CORONARY ARTERY WITHOUT ANGINA PECTORIS: ICD-10-CM

## 2023-04-16 DIAGNOSIS — R11.2 NAUSEA WITH VOMITING, UNSPECIFIED: ICD-10-CM

## 2023-04-16 DIAGNOSIS — E11.9 TYPE 2 DIABETES MELLITUS WITHOUT COMPLICATIONS: ICD-10-CM

## 2023-04-16 DIAGNOSIS — I10 ESSENTIAL (PRIMARY) HYPERTENSION: ICD-10-CM

## 2023-04-16 DIAGNOSIS — Z29.9 ENCOUNTER FOR PROPHYLACTIC MEASURES, UNSPECIFIED: ICD-10-CM

## 2023-04-16 LAB
ALBUMIN SERPL ELPH-MCNC: 3.8 G/DL — SIGNIFICANT CHANGE UP (ref 3.3–5)
ALP SERPL-CCNC: 126 U/L — HIGH (ref 40–120)
ALT FLD-CCNC: 15 U/L — SIGNIFICANT CHANGE UP (ref 4–41)
ANION GAP SERPL CALC-SCNC: 13 MMOL/L — SIGNIFICANT CHANGE UP (ref 7–14)
APPEARANCE UR: CLEAR — SIGNIFICANT CHANGE UP
APTT BLD: 31 SEC — SIGNIFICANT CHANGE UP (ref 27–36.3)
AST SERPL-CCNC: 24 U/L — SIGNIFICANT CHANGE UP (ref 4–40)
BACTERIA # UR AUTO: NEGATIVE — SIGNIFICANT CHANGE UP
BASE EXCESS BLDV CALC-SCNC: -0.1 MMOL/L — SIGNIFICANT CHANGE UP (ref -2–3)
BASOPHILS # BLD AUTO: 0.01 K/UL — SIGNIFICANT CHANGE UP (ref 0–0.2)
BASOPHILS NFR BLD AUTO: 0.1 % — SIGNIFICANT CHANGE UP (ref 0–2)
BILIRUB SERPL-MCNC: 0.5 MG/DL — SIGNIFICANT CHANGE UP (ref 0.2–1.2)
BILIRUB UR-MCNC: NEGATIVE — SIGNIFICANT CHANGE UP
BLOOD GAS VENOUS COMPREHENSIVE RESULT: SIGNIFICANT CHANGE UP
BUN SERPL-MCNC: 34 MG/DL — HIGH (ref 7–23)
CALCIUM SERPL-MCNC: 9.1 MG/DL — SIGNIFICANT CHANGE UP (ref 8.4–10.5)
CHLORIDE BLDV-SCNC: 107 MMOL/L — SIGNIFICANT CHANGE UP (ref 96–108)
CHLORIDE SERPL-SCNC: 105 MMOL/L — SIGNIFICANT CHANGE UP (ref 98–107)
CO2 BLDV-SCNC: 27.9 MMOL/L — HIGH (ref 22–26)
CO2 SERPL-SCNC: 24 MMOL/L — SIGNIFICANT CHANGE UP (ref 22–31)
COLOR SPEC: SIGNIFICANT CHANGE UP
CREAT SERPL-MCNC: 2.81 MG/DL — HIGH (ref 0.5–1.3)
DIFF PNL FLD: ABNORMAL
EGFR: 23 ML/MIN/1.73M2 — LOW
EOSINOPHIL # BLD AUTO: 0 K/UL — SIGNIFICANT CHANGE UP (ref 0–0.5)
EOSINOPHIL NFR BLD AUTO: 0 % — SIGNIFICANT CHANGE UP (ref 0–6)
EPI CELLS # UR: 1 /HPF — SIGNIFICANT CHANGE UP (ref 0–5)
FLUAV AG NPH QL: SIGNIFICANT CHANGE UP
FLUBV AG NPH QL: SIGNIFICANT CHANGE UP
GAS PNL BLDV: 138 MMOL/L — SIGNIFICANT CHANGE UP (ref 136–145)
GLUCOSE BLDC GLUCOMTR-MCNC: 124 MG/DL — HIGH (ref 70–99)
GLUCOSE BLDC GLUCOMTR-MCNC: 156 MG/DL — HIGH (ref 70–99)
GLUCOSE BLDV-MCNC: 168 MG/DL — HIGH (ref 70–99)
GLUCOSE SERPL-MCNC: 179 MG/DL — HIGH (ref 70–99)
GLUCOSE UR QL: ABNORMAL
HCO3 BLDV-SCNC: 26 MMOL/L — SIGNIFICANT CHANGE UP (ref 22–29)
HCT VFR BLD CALC: 33.8 % — LOW (ref 39–50)
HCT VFR BLDA CALC: 34 % — LOW (ref 39–51)
HGB BLD CALC-MCNC: 11.3 G/DL — LOW (ref 12.6–17.4)
HGB BLD-MCNC: 10.9 G/DL — LOW (ref 13–17)
HYALINE CASTS # UR AUTO: 2 /LPF — SIGNIFICANT CHANGE UP (ref 0–7)
IANC: 9.42 K/UL — HIGH (ref 1.8–7.4)
IMM GRANULOCYTES NFR BLD AUTO: 0.6 % — SIGNIFICANT CHANGE UP (ref 0–0.9)
INR BLD: 1.15 RATIO — SIGNIFICANT CHANGE UP (ref 0.88–1.16)
KETONES UR-MCNC: NEGATIVE — SIGNIFICANT CHANGE UP
LACTATE BLDV-MCNC: 1.2 MMOL/L — SIGNIFICANT CHANGE UP (ref 0.5–2)
LEUKOCYTE ESTERASE UR-ACNC: NEGATIVE — SIGNIFICANT CHANGE UP
LIDOCAIN IGE QN: 17 U/L — SIGNIFICANT CHANGE UP (ref 7–60)
LYMPHOCYTES # BLD AUTO: 1.03 K/UL — SIGNIFICANT CHANGE UP (ref 1–3.3)
LYMPHOCYTES # BLD AUTO: 9.4 % — LOW (ref 13–44)
MAGNESIUM SERPL-MCNC: 1.7 MG/DL — SIGNIFICANT CHANGE UP (ref 1.6–2.6)
MCHC RBC-ENTMCNC: 30 PG — SIGNIFICANT CHANGE UP (ref 27–34)
MCHC RBC-ENTMCNC: 32.2 GM/DL — SIGNIFICANT CHANGE UP (ref 32–36)
MCV RBC AUTO: 93.1 FL — SIGNIFICANT CHANGE UP (ref 80–100)
MONOCYTES # BLD AUTO: 0.41 K/UL — SIGNIFICANT CHANGE UP (ref 0–0.9)
MONOCYTES NFR BLD AUTO: 3.7 % — SIGNIFICANT CHANGE UP (ref 2–14)
NEUTROPHILS # BLD AUTO: 9.42 K/UL — HIGH (ref 1.8–7.4)
NEUTROPHILS NFR BLD AUTO: 86.2 % — HIGH (ref 43–77)
NITRITE UR-MCNC: NEGATIVE — SIGNIFICANT CHANGE UP
NRBC # BLD: 0 /100 WBCS — SIGNIFICANT CHANGE UP (ref 0–0)
NRBC # FLD: 0 K/UL — SIGNIFICANT CHANGE UP (ref 0–0)
NT-PROBNP SERPL-SCNC: 6147 PG/ML — HIGH
PCO2 BLDV: 50 MMHG — SIGNIFICANT CHANGE UP (ref 42–55)
PH BLDV: 7.33 — SIGNIFICANT CHANGE UP (ref 7.32–7.43)
PH UR: 6 — SIGNIFICANT CHANGE UP (ref 5–8)
PLATELET # BLD AUTO: 169 K/UL — SIGNIFICANT CHANGE UP (ref 150–400)
PO2 BLDV: 28 MMHG — SIGNIFICANT CHANGE UP (ref 25–45)
POTASSIUM BLDV-SCNC: 5.1 MMOL/L — SIGNIFICANT CHANGE UP (ref 3.5–5.1)
POTASSIUM SERPL-MCNC: 5.1 MMOL/L — SIGNIFICANT CHANGE UP (ref 3.5–5.3)
POTASSIUM SERPL-SCNC: 5.1 MMOL/L — SIGNIFICANT CHANGE UP (ref 3.5–5.3)
PROT SERPL-MCNC: 6.8 G/DL — SIGNIFICANT CHANGE UP (ref 6–8.3)
PROT UR-MCNC: ABNORMAL
PROTHROM AB SERPL-ACNC: 13.4 SEC — SIGNIFICANT CHANGE UP (ref 10.5–13.4)
RBC # BLD: 3.63 M/UL — LOW (ref 4.2–5.8)
RBC # FLD: 13.9 % — SIGNIFICANT CHANGE UP (ref 10.3–14.5)
RBC CASTS # UR COMP ASSIST: 1 /HPF — SIGNIFICANT CHANGE UP (ref 0–4)
RSV RNA NPH QL NAA+NON-PROBE: SIGNIFICANT CHANGE UP
SAO2 % BLDV: 37.6 % — LOW (ref 67–88)
SARS-COV-2 RNA SPEC QL NAA+PROBE: SIGNIFICANT CHANGE UP
SODIUM SERPL-SCNC: 142 MMOL/L — SIGNIFICANT CHANGE UP (ref 135–145)
SP GR SPEC: 1.01 — SIGNIFICANT CHANGE UP (ref 1.01–1.05)
TROPONIN T, HIGH SENSITIVITY RESULT: 20 NG/L — SIGNIFICANT CHANGE UP
TROPONIN T, HIGH SENSITIVITY RESULT: 23 NG/L — SIGNIFICANT CHANGE UP
UROBILINOGEN FLD QL: SIGNIFICANT CHANGE UP
WBC # BLD: 10.94 K/UL — HIGH (ref 3.8–10.5)
WBC # FLD AUTO: 10.94 K/UL — HIGH (ref 3.8–10.5)
WBC UR QL: 1 /HPF — SIGNIFICANT CHANGE UP (ref 0–5)

## 2023-04-16 PROCEDURE — 99285 EMERGENCY DEPT VISIT HI MDM: CPT

## 2023-04-16 PROCEDURE — 76700 US EXAM ABDOM COMPLETE: CPT | Mod: 26

## 2023-04-16 PROCEDURE — 71046 X-RAY EXAM CHEST 2 VIEWS: CPT | Mod: 26

## 2023-04-16 PROCEDURE — 99223 1ST HOSP IP/OBS HIGH 75: CPT

## 2023-04-16 RX ORDER — METOPROLOL TARTRATE 50 MG
50 TABLET ORAL DAILY
Refills: 0 | Status: DISCONTINUED | OUTPATIENT
Start: 2023-04-16 | End: 2023-04-16

## 2023-04-16 RX ORDER — FAMOTIDINE 10 MG/ML
20 INJECTION INTRAVENOUS ONCE
Refills: 0 | Status: COMPLETED | OUTPATIENT
Start: 2023-04-16 | End: 2023-04-16

## 2023-04-16 RX ORDER — ACETAMINOPHEN 500 MG
650 TABLET ORAL EVERY 6 HOURS
Refills: 0 | Status: DISCONTINUED | OUTPATIENT
Start: 2023-04-16 | End: 2023-04-16

## 2023-04-16 RX ORDER — ATORVASTATIN CALCIUM 80 MG/1
80 TABLET, FILM COATED ORAL AT BEDTIME
Refills: 0 | Status: DISCONTINUED | OUTPATIENT
Start: 2023-04-16 | End: 2023-04-16

## 2023-04-16 RX ORDER — SODIUM CHLORIDE 9 MG/ML
1000 INJECTION, SOLUTION INTRAVENOUS
Refills: 0 | Status: DISCONTINUED | OUTPATIENT
Start: 2023-04-16 | End: 2023-04-16

## 2023-04-16 RX ORDER — ATORVASTATIN CALCIUM 80 MG/1
1 TABLET, FILM COATED ORAL
Refills: 0 | DISCHARGE

## 2023-04-16 RX ORDER — AMLODIPINE BESYLATE 2.5 MG/1
5 TABLET ORAL DAILY
Refills: 0 | Status: DISCONTINUED | OUTPATIENT
Start: 2023-04-16 | End: 2023-04-16

## 2023-04-16 RX ORDER — HEPARIN SODIUM 5000 [USP'U]/ML
5000 INJECTION INTRAVENOUS; SUBCUTANEOUS EVERY 8 HOURS
Refills: 0 | Status: DISCONTINUED | OUTPATIENT
Start: 2023-04-16 | End: 2023-04-16

## 2023-04-16 RX ORDER — AMLODIPINE BESYLATE 2.5 MG/1
5 TABLET ORAL ONCE
Refills: 0 | Status: COMPLETED | OUTPATIENT
Start: 2023-04-16 | End: 2023-04-16

## 2023-04-16 RX ORDER — LANOLIN ALCOHOL/MO/W.PET/CERES
3 CREAM (GRAM) TOPICAL AT BEDTIME
Refills: 0 | Status: DISCONTINUED | OUTPATIENT
Start: 2023-04-16 | End: 2023-04-16

## 2023-04-16 RX ORDER — ALLOPURINOL 300 MG
100 TABLET ORAL
Refills: 0 | Status: DISCONTINUED | OUTPATIENT
Start: 2023-04-16 | End: 2023-04-16

## 2023-04-16 RX ORDER — ONDANSETRON 8 MG/1
4 TABLET, FILM COATED ORAL EVERY 8 HOURS
Refills: 0 | Status: DISCONTINUED | OUTPATIENT
Start: 2023-04-16 | End: 2023-04-16

## 2023-04-16 RX ORDER — ACETAMINOPHEN 500 MG
975 TABLET ORAL ONCE
Refills: 0 | Status: COMPLETED | OUTPATIENT
Start: 2023-04-16 | End: 2023-04-16

## 2023-04-16 RX ORDER — DEXTROSE 50 % IN WATER 50 %
15 SYRINGE (ML) INTRAVENOUS ONCE
Refills: 0 | Status: DISCONTINUED | OUTPATIENT
Start: 2023-04-16 | End: 2023-04-16

## 2023-04-16 RX ORDER — METOPROLOL TARTRATE 50 MG
1 TABLET ORAL
Refills: 0 | DISCHARGE

## 2023-04-16 RX ORDER — SODIUM CHLORIDE 9 MG/ML
500 INJECTION, SOLUTION INTRAVENOUS ONCE
Refills: 0 | Status: COMPLETED | OUTPATIENT
Start: 2023-04-16 | End: 2023-04-16

## 2023-04-16 RX ORDER — GLUCAGON INJECTION, SOLUTION 0.5 MG/.1ML
1 INJECTION, SOLUTION SUBCUTANEOUS ONCE
Refills: 0 | Status: DISCONTINUED | OUTPATIENT
Start: 2023-04-16 | End: 2023-04-16

## 2023-04-16 RX ORDER — INSULIN LISPRO 100/ML
6 VIAL (ML) SUBCUTANEOUS
Refills: 0 | Status: DISCONTINUED | OUTPATIENT
Start: 2023-04-16 | End: 2023-04-16

## 2023-04-16 RX ORDER — ONDANSETRON 8 MG/1
4 TABLET, FILM COATED ORAL ONCE
Refills: 0 | Status: COMPLETED | OUTPATIENT
Start: 2023-04-16 | End: 2023-04-16

## 2023-04-16 RX ORDER — INSULIN LISPRO 100 [IU]/ML
60 INJECTION, SUSPENSION SUBCUTANEOUS
Refills: 0 | DISCHARGE

## 2023-04-16 RX ORDER — INSULIN GLARGINE 100 [IU]/ML
36 INJECTION, SOLUTION SUBCUTANEOUS AT BEDTIME
Refills: 0 | Status: DISCONTINUED | OUTPATIENT
Start: 2023-04-16 | End: 2023-04-16

## 2023-04-16 RX ORDER — INSULIN LISPRO 100/ML
VIAL (ML) SUBCUTANEOUS
Refills: 0 | Status: DISCONTINUED | OUTPATIENT
Start: 2023-04-16 | End: 2023-04-16

## 2023-04-16 RX ORDER — DULAGLUTIDE 4.5 MG/.5ML
1.5 INJECTION, SOLUTION SUBCUTANEOUS
Refills: 0 | DISCHARGE

## 2023-04-16 RX ORDER — GABAPENTIN 400 MG/1
200 CAPSULE ORAL
Refills: 0 | Status: DISCONTINUED | OUTPATIENT
Start: 2023-04-16 | End: 2023-04-16

## 2023-04-16 RX ORDER — AMLODIPINE BESYLATE 2.5 MG/1
1 TABLET ORAL
Refills: 0 | DISCHARGE

## 2023-04-16 RX ORDER — DEXTROSE 50 % IN WATER 50 %
25 SYRINGE (ML) INTRAVENOUS ONCE
Refills: 0 | Status: DISCONTINUED | OUTPATIENT
Start: 2023-04-16 | End: 2023-04-16

## 2023-04-16 RX ORDER — SODIUM CHLORIDE 9 MG/ML
1000 INJECTION INTRAMUSCULAR; INTRAVENOUS; SUBCUTANEOUS
Refills: 0 | Status: DISCONTINUED | OUTPATIENT
Start: 2023-04-16 | End: 2023-04-16

## 2023-04-16 RX ORDER — GABAPENTIN 400 MG/1
200 CAPSULE ORAL
Refills: 0 | DISCHARGE

## 2023-04-16 RX ORDER — OXYCODONE HYDROCHLORIDE 5 MG/1
5 TABLET ORAL EVERY 6 HOURS
Refills: 0 | Status: DISCONTINUED | OUTPATIENT
Start: 2023-04-16 | End: 2023-04-16

## 2023-04-16 RX ORDER — ALLOPURINOL 300 MG
0 TABLET ORAL
Refills: 0 | DISCHARGE

## 2023-04-16 RX ORDER — LOSARTAN POTASSIUM 100 MG/1
1 TABLET, FILM COATED ORAL
Refills: 0 | DISCHARGE

## 2023-04-16 RX ADMIN — ONDANSETRON 4 MILLIGRAM(S): 8 TABLET, FILM COATED ORAL at 08:31

## 2023-04-16 RX ADMIN — Medication 100 MILLIGRAM(S): at 19:07

## 2023-04-16 RX ADMIN — SODIUM CHLORIDE 50 MILLILITER(S): 9 INJECTION INTRAMUSCULAR; INTRAVENOUS; SUBCUTANEOUS at 15:05

## 2023-04-16 RX ADMIN — GABAPENTIN 200 MILLIGRAM(S): 400 CAPSULE ORAL at 19:07

## 2023-04-16 RX ADMIN — OXYCODONE HYDROCHLORIDE 5 MILLIGRAM(S): 5 TABLET ORAL at 15:05

## 2023-04-16 RX ADMIN — SODIUM CHLORIDE 500 MILLILITER(S): 9 INJECTION, SOLUTION INTRAVENOUS at 08:31

## 2023-04-16 RX ADMIN — Medication 6 UNIT(S): at 18:35

## 2023-04-16 RX ADMIN — Medication 975 MILLIGRAM(S): at 08:31

## 2023-04-16 RX ADMIN — FAMOTIDINE 20 MILLIGRAM(S): 10 INJECTION INTRAVENOUS at 08:41

## 2023-04-16 RX ADMIN — AMLODIPINE BESYLATE 5 MILLIGRAM(S): 2.5 TABLET ORAL at 13:47

## 2023-04-16 NOTE — H&P ADULT - ASSESSMENT
73 yo gentlemen with history of T2DM, HTN, CAD (s/p CABG), CKD stage IV p/w N/V and dark water diarrhea and fatigue for 1 week duration.  Additionally, the patient endorses sharp left upper back pain, radiating down to left elbow which he attributes to exercise at home. He denies shortness of breath, fevers, chills or recent trauma.

## 2023-04-16 NOTE — ED PROVIDER NOTE - ATTENDING CONTRIBUTION TO CARE
I performed a face-to-face evaluation of the patient and performed a history and physical examination along with the resident or ACP, and/or medical student above.  I agree with the history and physical examination as documented by the resident or ACP, and/or medical student above.  Mccray:  Gen: Alert Ox3. NAD.   HEENT: Atraumatic. Mucous membranes dry.   CV: RRR. No significant LE edema.   Resp: Unlabored-respirations. CTAB.  GI: Abdomen non tender to palpation, soft.  Skin/MSK: No open wounds. TTP over L upper back paraspinal msk - lidocaine patch in place. Strength intact throughout LUE. Subjective decreased sensation in ulnar distribution at hand. DP and PT, and radial pulses 2+ bilat.   Neuro: EOMI. Pupils ERRL. Following commands.   Psych: Appropriate mood, cooperative

## 2023-04-16 NOTE — H&P ADULT - PROBLEM SELECTOR PLAN 2
Pending stool stool studies and abdominal xr  Continue zofran PRN Pending stool stool studies and abdominal xr  Continue zofran PRN  RUQ u/s to r/o gallbladder pathology as well abdominal xr (CXR shows some dilated bowel loops).

## 2023-04-16 NOTE — H&P ADULT - PROBLEM SELECTOR PLAN 3
Likely volume depletion from GI losses   Patient bolused by ED, can continue gentle IV hydration and encourage adequate PO intake

## 2023-04-16 NOTE — ED PROVIDER NOTE - CLINICAL SUMMARY MEDICAL DECISION MAKING FREE TEXT BOX
71 yo M PMHx of HTN, DM, CAD, CABG, CKD4, presenting to ED for c/o N/V x 1 week. Associated watery diarrhea and fatigue, and dark stool, however pt reports this is 2/2 to iron supplement. No fevers. Also reports L upper back pain radiating down L arm to elbow - pain described as sharp and worst at elbow. States back pain began 2 weeks ago. Attributes this to exercise at home, states symptoms not similar to those w/ previous CABG - only SOB at that time. Recent outpt nephro visit (Dr. Moore Sayed) w/ worsening renal function from baseline to Cr of 2.26 (baseline previously 1.6). Denies CP or SOB. Denies abd pain or changes in urination. Not on AC. Chronic bilat LE swelling per pt. Denies cough or congestion. No recent trauma. Exam as above. Consider gastrointestinal illness, gastroenteritis, electrolyte abnormality, LISETH, dehydration, gastritis, peptic ulcer disease, upper GI bleeding, musculoskeletal strain, radiculopathy, pneumonia, URI, medication side effect.  Consider atypical presentation of ACS.  Will obtain lab work, EKG, chest x-ray, urinalysis, provide IV fluids, symptomatic treatment, will reassess.

## 2023-04-16 NOTE — ED ADULT TRIAGE NOTE - CHIEF COMPLAINT QUOTE
Pt st " For days I feel nauseus then for last 2 days I have a pain in left upper back traveling down left arm." Hx of Bipass 2022, htn, DM, gout

## 2023-04-16 NOTE — CHART NOTE - NSCHARTNOTEFT_GEN_A_CORE
ACP NIGHT MEDICINE COVERAGE.    Notified by RN, patient requesting to leave AMA. Pt seen, upon arrival patient laying down on stretcher in hallway, awake & alert, NAD, on RA. Pt reports wanting to go home, he acknowledges that he has an acute kidney injury, however when assessing MS, was unable to tell me location and situation (A&Ox2 to self/year & president). Pt adamant on wanting to go home, was frustrated when asked question to further evaluate MS (situation, location, concern for his safety). Pts wife, Yamilex called at [phone], to discuss patients ADLs and Mental status. Per pts wife, he does have intermittent moments of confusion, which is not new, however she reports he is dependent on her care. Discussed acute events above, and the desire that pt wants to leave and go home. She was amenable to trial PO/IV medications if necessary to help calm patient down if situation escalates. Will continue to monitor    Vital Signs Last 24 Hrs  T(C): 36.7 (16 Apr 2023 19:27), Max: 36.9 (16 Apr 2023 07:03)  T(F): 98 (16 Apr 2023 19:27), Max: 98.5 (16 Apr 2023 07:03)  HR: 88 (16 Apr 2023 19:27) (70 - 88)  BP: 154/80 (16 Apr 2023 19:27) (140/76 - 166/81)  RR: 20 (16 Apr 2023 19:27) (16 - 21)  SpO2: 100% (16 Apr 2023 19:27) (99% - 100%)    Parameters below as of 16 Apr 2023 19:27  Patient On (Oxygen Delivery Method): room air      Sujatha Mccauley PA  Medicine [id number]

## 2023-04-16 NOTE — PATIENT PROFILE ADULT - FALL HARM RISK - UNIVERSAL INTERVENTIONS
Bed in lowest position, wheels locked, appropriate side rails in place/Call bell, personal items and telephone in reach/Instruct patient to call for assistance before getting out of bed or chair/Non-slip footwear when patient is out of bed/[place] to call system/Physically safe environment - no spills, clutter or unnecessary equipment/Purposeful Proactive Rounding/Room/bathroom lighting operational, light cord in reach

## 2023-04-16 NOTE — ED PROVIDER NOTE - OBJECTIVE STATEMENT
71 yo M PMHx of HTN, DM, CAD, CABG, CKD4, presenting to ED for c/o N/V x 1 week. Associated watery diarrhea and fatigue, and dark stool, however pt reports this is 2/2 to iron supplement. No fevers. Also reports L upper back pain radiating down L arm to elbow - pain described as sharp and worst at elbow. States back pain began 2 weeks ago. Attributes this to exercise at home, states symptoms not similar to those w/ previous CABG - only SOB at that time. Recent outpt nephro visit (Dr. Moore Sayed) w/ worsening renal function from baseline to Cr of 2.26 (baseline previously 1.6). Denies CP or SOB. Denies abd pain or changes in urination. Not on AC. Chronic bilat LE swelling per pt. Denies cough or congestion. No recent trauma.

## 2023-04-16 NOTE — H&P ADULT - PROBLEM SELECTOR PLAN 7
Can place on heparin for DVT prophylaxis EKG w/o change  trop x2 wnl  Cardiology consult, patient relatively recent CABG

## 2023-04-16 NOTE — ED ADULT NURSE REASSESSMENT NOTE - NS ED NURSE REASSESS COMMENT FT1
patient is resting comfortably at this time. NAD noted. no requests at this time. safety maintained. respirations even, nonlabored. IVF infusing as ordered. pt pending US/results.

## 2023-04-16 NOTE — H&P ADULT - PROBLEM SELECTOR PLAN 1
Describes black water diarrhea, also taking iron supplementation   Denies laxative use, recent travel or other dietary changes  LISETH on CKD likely a result of ongoing diarrhea  Pending stool studies Describes black water diarrhea, also taking iron supplementation   Denies laxative use, recent travel or other dietary changes  LISETH on CKD likely a result of ongoing GI losses

## 2023-04-16 NOTE — ED PROVIDER NOTE - PHYSICAL EXAMINATION
Gen: Alert Ox3. NAD.   HEENT: Atraumatic. Mucous membranes dry.   CV: RRR. No significant LE edema.   Resp: Unlabored-respirations. CTAB.  GI: Abdomen non tender to palpation, soft.  Skin/MSK: No open wounds. TTP over L upper back paraspinal msk - lidocaine patch in place. Strength intact throughout LUE. Subjective decreased sensation in ulnar distribution at hand. DP and PT, and radial pulses 2+ bilat.   Neuro: EOMI. Pupils ERRL. Following commands.   Psych: Appropriate mood, cooperative

## 2023-04-16 NOTE — H&P ADULT - PROBLEM SELECTOR PLAN 4
SBPs in the 150-160's restart home meds, however will be mindful to hold if diarrhea worsening SBPs in the 150-160's, hold ARB for now as per nephrology

## 2023-04-16 NOTE — CONSULT NOTE ADULT - ASSESSMENT
73 yo M PMHx of HTN, DM, CAD, CABG, CKD3b, presenting to ED for c/o N/V x 1 week  Acute on chronic renal failure in light of GI losses.  Also started on Cozaar 50mg po qd about 10- days   RUQ pain a few days ago   Left shoulder pain and hx of cervical stenosis    1 GI-Abd sono on the right upper to rule out gallbladder pathology  2 Renal-Can hold ARB;  Start NS 50cc/hr for now   He does have proteinuria that was just done  No need to check PTH  3 [facility] eval pending.  Ozzie Ruiz et al     [facility]   [phone]

## 2023-04-16 NOTE — ED PROVIDER NOTE - PROGRESS NOTE DETAILS
Marquise Gabriel PGY3: Creatinine increasing in setting of N/V. BNP elevated to 6000. Pt accepted for admission for further management.

## 2023-04-16 NOTE — CONSULT NOTE ADULT - SUBJECTIVE AND OBJECTIVE BOX
NEPHROLOGY - NSN    Patient seen and examined.    HPI:     71 yo M PMHx of HTN, DM, CAD, CABG, CKD4, presenting to ED for c/o N/V x 1 week. Associated watery diarrhea and fatigue, and dark stool, however pt reports this is 2/2 to iron supplement. No fevers. Also reports L upper back pain radiating down L arm to elbow - pain described as sharp and worst at elbow. States back pain began 2 weeks ago. Attributes this to exercise at home, states symptoms not similar to those w/ previous CABG - only SOB at that time.   Denies CP or SOB. Denies abd pain or changes in urination. Not on AC. Chronic bilat LE swelling per pt. Denies cough or congestion. No recent trauma      PAST MEDICAL & SURGICAL HISTORY:  HTN (hypertension)      HLD (hyperlipidemia)      DM (diabetes mellitus)      Gout      CKD (chronic kidney disease)      No significant past surgical history          MEDICATIONS  (STANDING):  amLODIPine   Tablet 5 milliGRAM(s) Oral once      Allergies    No Known Allergies    Intolerances        SOCIAL HISTORY:  Denies alcohol abuse, drug abuse or tobacco usage.     FAMILY HISTORY:      VITALS:  T(C): 36.9 (23 @ 07:03), Max: 36.9 (23 @ 07:03)  HR: 84 (23 @ 10:58) (70 - 84)  BP: 146/91 (23 @ 10:58) (146/91 - 159/78)  RR: 16 (23 @ 10:58) (16 - 18)  SpO2: 100% (23 @ 10:58) (99% - 100%)    REVIEW OF SYSTEMS:  Denies any nausea, vomiting, diarrhea, fever or chills. Denies chest pain, SOB, focal weakness, hematuria or dysuria. Good oral intake and denies fatigue or weakness. All other pertinent systems are reviewed and are negative.    PHYSICAL EXAM:  Constitutional: NAD  HEENT: EOMI  Neck:  No JVD, supple   Respiratory: CTA B/L  Cardiovascular: S1 and S2, RRR  Gastrointestinal: + BS, soft, NT, ND  Extremities: No peripheral edema, + peripheral pulses  Neurological: A/O x 3, CN2-12 intact  Psychiatric: Normal mood, normal affect  : No Isidro  Skin: No rashes, C/D/I  Access: Not applicable    I and O's:        LABS:                        10.9   10.94 )-----------( 169      ( 2023 07:55 )             33.8         142  |  105  |  34<H>  ----------------------------<  179<H>  5.1   |  24  |  2.81<H>    Ca    9.1      2023 07:55  Mg     1.70         TPro  6.8  /  Alb  3.8  /  TBili  0.5  /  DBili  x   /  AST  24  /  ALT  15  /  AlkPhos  126<H>        URINE:  Urinalysis Basic - ( 2023 11:05 )    Color: Light Yellow / Appearance: Clear / S.013 / pH: x  Gluc: x / Ketone: Negative  / Bili: Negative / Urobili: <2 mg/dL   Blood: x / Protein: 300 mg/dL / Nitrite: Negative   Leuk Esterase: Negative / RBC: 1 /HPF / WBC 1 /HPF   Sq Epi: x / Non Sq Epi: x / Bacteria: Negative        RADIOLOGY & ADDITIONAL STUDIES:     NEPHROLOGY - NSN    Patient seen and examined.    HPI:     73 yo M PMHx of HTN, DM, CAD, CABG, CKD3b, presenting to ED for c/o N/V x 1 week. Associated watery diarrhea and fatigue, and dark stool, however pt reports this is 2/2 to iron supplement. No fevers. Also reports L upper back pain radiating down L arm to elbow - pain described as sharp and worst at elbow. States back pain began 2 weeks ago. Attributes this to exercise at home, states symptoms not similar to those w/ previous CABG - only SOB at that time.   Denies CP or SOB. Denies abd pain or changes in urination. Not on AC. Chronic bilat LE swelling per pt. Denies cough or congestion. No recent trauma  Abd pain 2 days ago and now diarrha and vomiting this am.  Food taste "acidy"  There is no hematuria or bubbles in the urine.  No history of NSAIDS or nephrolithisis.  The patient urinates once or twice in the night and there is no incontinence.  No family hx or renal disease or back pain.    No recent abx use.  No alleviating or aggravating factors with respect to the kidneys.       PAST MEDICAL & SURGICAL HISTORY:  HTN (hypertension)      HLD (hyperlipidemia)      DM (diabetes mellitus)      Gout      CKD (chronic kidney disease)      No significant past surgical history          MEDICATIONS  (STANDING):  amLODIPine   Tablet 5 milliGRAM(s) Oral once      Allergies    No Known Allergies    Intolerances        SOCIAL HISTORY:  Denies alcohol abuse, drug abuse or tobacco usage.     FAMILY HISTORY:      VITALS:  T(C): 36.9 (23 @ 07:03), Max: 36.9 (23 @ 07:03)  HR: 84 (23 @ 10:58) (70 - 84)  BP: 146/91 (23 @ 10:58) (146/91 - 159/78)  RR: 16 (23 @ 10:58) (16 - 18)  SpO2: 100% (23 @ 10:58) (99% - 100%)    REVIEW OF SYSTEMS:   Denies chest pain, SOB, focal weakness, hematuria or dysuria.  + fatigue or weakness. All other pertinent systems are reviewed and are negative.    PHYSICAL EXAM:  Constitutional: NAD  HEENT: EOMI  Neck:  No JVD, supple   Respiratory: CTA B/L  Cardiovascular: S1 and S2, RRR  Gastrointestinal: + BS, soft, NT, ND  Extremities: No peripheral edema, + peripheral pulses  Neurological: A/O x 3, CN2-12 intact  Psychiatric: Normal mood, normal affect  : No Isidro  Skin: No rashes, C/D/I  Access: Not applicable    I and O's:        LABS:                        10.9   10.94 )-----------( 169      ( 2023 07:55 )             33.8         142  |  105  |  34<H>  ----------------------------<  179<H>  5.1   |  24  |  2.81<H>    Ca    9.1      2023 07:55  Mg     1.70         TPro  6.8  /  Alb  3.8  /  TBili  0.5  /  DBili  x   /  AST  24  /  ALT  15  /  AlkPhos  126<H>        URINE:  Urinalysis Basic - ( 2023 11:05 )    Color: Light Yellow / Appearance: Clear / S.013 / pH: x  Gluc: x / Ketone: Negative  / Bili: Negative / Urobili: <2 mg/dL   Blood: x / Protein: 300 mg/dL / Nitrite: Negative   Leuk Esterase: Negative / RBC: 1 /HPF / WBC 1 /HPF   Sq Epi: x / Non Sq Epi: x / Bacteria: Negative        RADIOLOGY & ADDITIONAL STUDIES:    < from: Xray Chest 2 Views PA/Lat (23 @ 08:46) >    ACC: [id number] EXAM:  XR CHEST PA LAT 2V   ORDERED BY: NADEEM COWART     PROCEDURE DATE:  2023          INTERPRETATION:  EXAMINATION: XR CHEST PA AND LATERAL    CLINICAL INDICATION: Back pain and vomiting    TECHNIQUE: 2 views; Frontal and lateral views of the chest were obtained.    COMPARISON: Chest x-ray 2022.    FINDINGS:  Sternotomy, mediastinal surgical clips.  The heart is normal in size.  The lungs are clear.  There is no pneumothorax or pleural effusion.  No acute osseous abnormalities.    IMPRESSION:  Clear lungs.    --- End of Report ---          ANNEMARIE MELTON MD; Resident Radiologist  This document has been electronically signed.  AURORA SAM MD; Attending Radiologist  This document has been electronically signed. 2023  8:54AM    < end of copied text >

## 2023-04-16 NOTE — H&P ADULT - HISTORY OF PRESENT ILLNESS
71 yo gentlemen with history of T2DM, HTN, CAD (s/p CABG), CKD stage IV p/w N/V and dark water diarrhea and fatigue for 1 week duration. Patient unable to attribute symptoms to any triggering event, denies recent travels or changes in diet. Additionally, the patient endorses sharp left upper back pain, radiating down to left elbow which he attributes to exercise at home. He denies shortness of breath, fevers, chills or recent trauma.       PAST MEDICAL & SURGICAL HISTORY:  HTN (hypertension)      HLD (hyperlipidemia)      DM (diabetes mellitus)      Gout      CKD (chronic kidney disease)      No significant past surgical history          Review of Systems:   CONSTITUTIONAL: No fever, weight loss, or fatigue  EYES: No eye pain, visual disturbances, or discharge  ENMT:  No difficulty hearing, tinnitus, vertigo; No sinus or throat pain  NECK: No pain or stiffness  RESPIRATORY: No cough, wheezing, chills or hemoptysis; No shortness of breath  CARDIOVASCULAR: No chest pain, palpitations, dizziness, or leg swelling  GASTROINTESTINAL: as per HPI  GENITOURINARY: No dysuria, frequency, hematuria, or incontinence  NEUROLOGICAL: No headaches, memory loss, loss of strength, numbness, or tremors  SKIN: No itching, burning, rashes, or lesions   LYMPH NODES: No enlarged glands  ENDOCRINE: No heat or cold intolerance; No hair loss  MUSCULOSKELETAL:as per HPI  PSYCHIATRIC: No depression, anxiety, mood swings, or difficulty sleeping  HEME/LYMPH: No easy bruising, or bleeding gums  ALLERGY AND IMMUNOLOGIC: No hives or eczema    Allergies    No Known Allergies    Intolerances        Social History:     FAMILY HISTORY:      MEDICATIONS  (STANDING):  amLODIPine   Tablet 5 milliGRAM(s) Oral once    MEDICATIONS  (PRN):      T(C): 36.9 (23 @ 07:03), Max: 36.9 (23 @ 07:03)  HR: 84 (23 @ 10:58) (70 - 84)  BP: 146/91 (23 @ 10:58) (146/91 - 159/78)  RR: 16 (23 @ 10:58) (16 - 18)  SpO2: 100% (23 @ 10:58) (99% - 100%)    CAPILLARY BLOOD GLUCOSE      POCT Blood Glucose.: 163 mg/dL (2023 07:09)    I&O's Summary      PHYSICAL EXAM:  GENERAL: NAD, well-developed  HEAD:  Atraumatic, Normocephalic  EYES: EOMI, PERRLA, conjunctiva and sclera clear  NECK: Supple, No elevated JVD  CHEST/LUNG: Clear to auscultation bilaterally; No wheeze  HEART: Regular rate and rhythm; No murmurs, rubs, or gallops  ABDOMEN: Soft, Nontender, Nondistended; Bowel sounds present  EXTREMITIES:  2+ Peripheral Pulses, No clubbing, cyanosis, or edema  PSYCH: AAOx3  NEUROLOGY: CN II-XII grossly intact, moving all extremities  SKIN: No rashes or lesions    LABS:                        10.9   10.94 )-----------( 169      ( 2023 07:55 )             33.8     04-    142  |  105  |  34<H>  ----------------------------<  179<H>  5.1   |  24  |  2.81<H>    Ca    9.1      2023 07:55  Mg     1.70     -    TPro  6.8  /  Alb  3.8  /  TBili  0.5  /  DBili  x   /  AST  24  /  ALT  15  /  AlkPhos  126<H>  04-16    PT/INR - ( 2023 07:55 )   PT: 13.4 sec;   INR: 1.15 ratio         PTT - ( 2023 07:55 )  PTT:31.0 sec      Urinalysis Basic - ( 2023 11:05 )    Color: Light Yellow / Appearance: Clear / S.013 / pH: x  Gluc: x / Ketone: Negative  / Bili: Negative / Urobili: <2 mg/dL   Blood: x / Protein: 300 mg/dL / Nitrite: Negative   Leuk Esterase: Negative / RBC: 1 /HPF / WBC 1 /HPF   Sq Epi: x / Non Sq Epi: x / Bacteria: Negative          RADIOLOGY & ADDITIONAL TESTS:    ECG Personally Reviewed -     Imaging Personally Reviewed:    Consultant(s) Notes Reviewed:      Care Discussed with Consultants/Other Providers:   73 yo gentlemen with history of T2DM, HTN, CAD (s/p CABG), CKD stage IV p/w N/V and dark water diarrhea and fatigue for 1 week duration. There is mild right quadrant discomfort at times, but states that he is mostly pain free. His last diarrhea was two days ago and however yesterday he vomited for the better part of the day. Patient unable to attribute symptoms to any triggering event, though he did come back 2 weeks ago from an extended 4-5 month trip to [facility].  Additionally, the patient endorses sharp left upper back pain, radiating down to left elbow which he attributes to exercise at home, his pain at this time is severe and he is requesting oxycodone for alleviation. He denies shortness of breath, fevers, chills or recent trauma.       PAST MEDICAL & SURGICAL HISTORY:  HTN (hypertension)      HLD (hyperlipidemia)      DM (diabetes mellitus)      Gout      CKD (chronic kidney disease)      No significant past surgical history          Review of Systems:   CONSTITUTIONAL: No fever, weight loss, or fatigue  EYES: No eye pain, visual disturbances, or discharge  ENMT:  No difficulty hearing, tinnitus, vertigo; No sinus or throat pain  NECK: No pain or stiffness  RESPIRATORY: No cough, wheezing, chills or hemoptysis; No shortness of breath  CARDIOVASCULAR: No chest pain, palpitations, dizziness, or leg swelling  GASTROINTESTINAL: as per HPI  GENITOURINARY: No dysuria, frequency, hematuria, or incontinence  NEUROLOGICAL: No headaches, memory loss, loss of strength, numbness, or tremors  SKIN: No itching, burning, rashes, or lesions   LYMPH NODES: No enlarged glands  ENDOCRINE: No heat or cold intolerance; No hair loss  MUSCULOSKELETAL: as per HPI  PSYCHIATRIC: No depression, anxiety, mood swings, or difficulty sleeping  HEME/LYMPH: No easy bruising, or bleeding gums  ALLERGY AND IMMUNOLOGIC: No hives or eczema    Allergies    No Known Allergies    Intolerances        Social History: Used to be a former drinker, now denies smoking, drinking or drug use    FAMILY HISTORY: Heart disease in all siblings      MEDICATIONS  (STANDING):  amLODIPine   Tablet 5 milliGRAM(s) Oral once    MEDICATIONS  (PRN):      T(C): 36.9 (23 @ 07:03), Max: 36.9 (23 @ 07:03)  HR: 84 (23 @ 10:58) (70 - 84)  BP: 146/91 (23 @ 10:58) (146/91 - 159/78)  RR: 16 (23 @ 10:58) (16 - 18)  SpO2: 100% (23 @ 10:58) (99% - 100%)    CAPILLARY BLOOD GLUCOSE      POCT Blood Glucose.: 163 mg/dL (2023 07:09)    I&O's Summary      PHYSICAL EXAM:  GENERAL: NAD, well-developed, pleasant  HEAD:  Atraumatic, Normocephalic  EYES: conjunctiva and sclera clear  NECK: Supple, No elevated JVD  CHEST/LUNG: Clear to auscultation bilaterally; No wheeze  HEART: Regular rate and rhythm; No murmurs, rubs, or gallops  ABDOMEN: Soft, Nontender, Nondistended; Bowel sounds present  EXTREMITIES:  2+ Peripheral Pulses, No clubbing, cyanosis, or edema  PSYCH: AAOx3  NEUROLOGY: CN II-XII grossly intact, moving all extremities  SKIN: No rashes or lesions    LABS:                        10.9   10.94 )-----------( 169      ( 2023 07:55 )             33.8         142  |  105  |  34<H>  ----------------------------<  179<H>  5.1   |  24  |  2.81<H>    Ca    9.1      2023 07:55  Mg     1.70         TPro  6.8  /  Alb  3.8  /  TBili  0.5  /  DBili  x   /  AST  24  /  ALT  15  /  AlkPhos  126<H>      PT/INR - ( 2023 07:55 )   PT: 13.4 sec;   INR: 1.15 ratio         PTT - ( 2023 07:55 )  PTT:31.0 sec      Urinalysis Basic - ( 2023 11:05 )    Color: Light Yellow / Appearance: Clear / S.013 / pH: x  Gluc: x / Ketone: Negative  / Bili: Negative / Urobili: <2 mg/dL   Blood: x / Protein: 300 mg/dL / Nitrite: Negative   Leuk Esterase: Negative / RBC: 1 /HPF / WBC 1 /HPF   Sq Epi: x / Non Sq Epi: x / Bacteria: Negative          RADIOLOGY & ADDITIONAL TESTS:    ECG Personally Reviewed -     Imaging Personally Reviewed:    Consultant(s) Notes Reviewed:      Care Discussed with Consultants/Other Providers:

## 2023-04-16 NOTE — ED ADULT NURSE NOTE - OBJECTIVE STATEMENT
Pt is A&O x 4, ambulatory w/o assist, shows no signs of acute distress. Brought into the ED with left back pain that radiates to the left elbow. Pt states the pain in his elbow is more intense than in his back. Pt also endorses intermittent n/v/d. Pt had a triple bypass approx 1 year ago. Denies SOB at this time. ECG taken, NSR w/ 1st degree heart block. Placed on continuous telemetry monitoring. VSS. Respirations even and unlabored. Will continue to monitor.

## 2023-04-17 LAB
CULTURE RESULTS: SIGNIFICANT CHANGE UP
SPECIMEN SOURCE: SIGNIFICANT CHANGE UP

## 2023-07-03 ENCOUNTER — APPOINTMENT (OUTPATIENT)
Dept: UROLOGY | Facility: CLINIC | Age: 73
End: 2023-07-03
Payer: MEDICARE

## 2023-07-03 DIAGNOSIS — Z82.49 FAMILY HISTORY OF ISCHEMIC HEART DISEASE AND OTHER DISEASES OF THE CIRCULATORY SYSTEM: ICD-10-CM

## 2023-07-03 PROCEDURE — 99204 OFFICE O/P NEW MOD 45 MIN: CPT

## 2023-07-03 NOTE — ASSESSMENT
[FreeTextEntry1] : patient wth no fh of renal stones or pca\par no hx of passng stones\par had vague abd pain, no fever, no N/V\par PCP got abd sono for above that showed bilat stones\par sees nephrologist q 6m \par good flow to void, no hematuria, no dysuria , feels empty after void \par brings copies of reporst to review ( may 2023) \par right LP 4 x 5 x 3 mm stone \par left MP  2 x 4 x 3 mm stone \par bilat cysts each with 3 \par prostate vol 20 ml\par urine : clear , creat 2.44 \par here for further eval:\par 1-as no blood n urine and no LUTS with small prostat - no meds needed\par 2- cont fluids 2 l water /day with minimal salt \par 3- annual sono for stones - right 2mmn MP is passable \par 4- signs and sxs of passing stone discussed\par 5- as no fh of pca and given age- PCa screening not recommended  with psa \par

## 2023-07-03 NOTE — HISTORY OF PRESENT ILLNESS
[FreeTextEntry1] : patient wth no fh of renal stones or pca\par no hx of passng stones\par had vague abd pain, no fever, no N/V\par PCP got abd sono for above that showed bilat stones\par sees nephrologist q 6m \par good flow to void, no hematuria, no dysuria , feels empty after void \par brings copies of reporst to review ( may 2023) \par right LP 4 x 5 x 3 mm stone \par left MP  2 x 4 x 3 mm stone \par bilat cysts each with 3 \par prostate vol 20 ml\par urine : clear , creat 2.44 \par here for further eval:

## 2023-07-03 NOTE — PHYSICAL EXAM
[General Appearance - Well Developed] : well developed [General Appearance - Well Nourished] : well nourished [Normal Appearance] : normal appearance [Well Groomed] : well groomed [General Appearance - In No Acute Distress] : no acute distress [Edema] : no peripheral edema [Respiration, Rhythm And Depth] : normal respiratory rhythm and effort [Exaggerated Use Of Accessory Muscles For Inspiration] : no accessory muscle use [Abdomen Soft] : soft [Abdomen Tenderness] : non-tender [Costovertebral Angle Tenderness] : no ~M costovertebral angle tenderness [Normal Station and Gait] : the gait and station were normal for the patient's age [] : no rash [No Focal Deficits] : no focal deficits [Oriented To Time, Place, And Person] : oriented to person, place, and time [Affect] : the affect was normal [Mood] : the mood was normal [Not Anxious] : not anxious [No Palpable Adenopathy] : no palpable adenopathy [FreeTextEntry1] : obese

## 2023-07-10 ENCOUNTER — APPOINTMENT (OUTPATIENT)
Dept: UROLOGY | Facility: CLINIC | Age: 73
End: 2023-07-10
Payer: MEDICARE

## 2023-07-10 VITALS
TEMPERATURE: 97.3 F | HEART RATE: 73 BPM | OXYGEN SATURATION: 94 % | BODY MASS INDEX: 29.54 KG/M2 | WEIGHT: 200 LBS | DIASTOLIC BLOOD PRESSURE: 73 MMHG | SYSTOLIC BLOOD PRESSURE: 144 MMHG

## 2023-07-10 DIAGNOSIS — N20.0 CALCULUS OF KIDNEY: ICD-10-CM

## 2023-07-10 DIAGNOSIS — N28.1 CYST OF KIDNEY, ACQUIRED: ICD-10-CM

## 2023-07-10 PROCEDURE — 99213 OFFICE O/P EST LOW 20 MIN: CPT

## 2023-07-10 NOTE — PHYSICAL EXAM
[Normal Appearance] : normal appearance [Abdomen Tenderness] : non-tender [Epididymis] : the epididymides were normal

## 2023-07-10 NOTE — LETTER BODY
[Dear  ___] : Dear  [unfilled], [Consult Letter:] : I had the pleasure of evaluating your patient, [unfilled]. [Please see my note below.] : Please see my note below. [Consult Closing:] : Thank you very much for allowing me to participate in the care of this patient.  If you have any questions, please do not hesitate to contact me. [Sincerely,] : Sincerely, [FreeTextEntry2] : Nato Matute MD\par [address] [facility]par [place], [place]\par [address] [FreeTextEntry3] : Malachi Mckeon MD\par [facility]\par [address] [place] [address], [address]\par [place], [place]\par [address]\par p: [phone]\par f: [phone]

## 2023-07-10 NOTE — ASSESSMENT
[FreeTextEntry1] : 72 y.o. M with bilateral ureteral stones, CKD\par -Patient requesting removal of stones.  He is inquiring whether this will improve his kidney function.  Discussed that given that the stones are nonobstructing, I have a low suspicion this is contributing to his overall poor kidney function, and removing them is unlikely to change his kidney function.  He has no hydronephrosis.  Discussed that treatment for kidney stones can lead to scarring of the kidneys / ureters, which could worsen kidney function.  He understands this, but would still like to move forward with stone removal.  I did recommend a CT scan to evaluate the location of the stones and other stone characteristics.  We discussed that based on the size of the stones, options would include ESWL or ureteroscopy, however we will need to look at the CT first.  He would prefer ESWL after hearing the risk and benefits of each, but we will discuss further after CT

## 2023-07-10 NOTE — HISTORY OF PRESENT ILLNESS
[FreeTextEntry1] : 72-year-old male presents with renal stones.\par \par Has a history of CKD, last creatinine 2.44\par Had a renal ultrasound to evaluate CKD.  No hydronephrosis, but did reveal bilateral stones:\par Right LP 4 x 5 x 3 mm stone \par Left MP 2 x 4 x 3 mm stone \par Bilat cysts each with 3 \par Prostate vol 20 ml\par \par He is requesting stone removal.  No flank pain, fever, chills.  He is inquiring whether or not stone removal will improve his kidney function\par \par Labs reviewed\par Cr 2.44\par UA without microscopic hematuria.  \par PSA 0.25

## 2023-07-12 ENCOUNTER — APPOINTMENT (OUTPATIENT)
Dept: CT IMAGING | Facility: IMAGING CENTER | Age: 73
End: 2023-07-12
Payer: MEDICARE

## 2023-07-12 ENCOUNTER — OUTPATIENT (OUTPATIENT)
Dept: OUTPATIENT SERVICES | Facility: HOSPITAL | Age: 73
LOS: 1 days | End: 2023-07-12
Payer: MEDICARE

## 2023-07-12 DIAGNOSIS — Z00.8 ENCOUNTER FOR OTHER GENERAL EXAMINATION: ICD-10-CM

## 2023-07-12 PROCEDURE — 74176 CT ABD & PELVIS W/O CONTRAST: CPT

## 2023-07-12 PROCEDURE — 74176 CT ABD & PELVIS W/O CONTRAST: CPT | Mod: 26

## 2023-07-19 DIAGNOSIS — N28.89 OTHER SPECIFIED DISORDERS OF KIDNEY AND URETER: ICD-10-CM

## 2023-07-31 ENCOUNTER — APPOINTMENT (OUTPATIENT)
Dept: MRI IMAGING | Facility: IMAGING CENTER | Age: 73
End: 2023-07-31
Payer: MEDICARE

## 2023-07-31 ENCOUNTER — OUTPATIENT (OUTPATIENT)
Dept: OUTPATIENT SERVICES | Facility: HOSPITAL | Age: 73
LOS: 1 days | End: 2023-07-31
Payer: MEDICARE

## 2023-07-31 DIAGNOSIS — N28.89 OTHER SPECIFIED DISORDERS OF KIDNEY AND URETER: ICD-10-CM

## 2023-07-31 PROCEDURE — A9585: CPT

## 2023-07-31 PROCEDURE — 74183 MRI ABD W/O CNTR FLWD CNTR: CPT

## 2023-07-31 PROCEDURE — 74183 MRI ABD W/O CNTR FLWD CNTR: CPT | Mod: 26

## 2023-09-16 NOTE — H&P ADULT - NSHPPOAPULMEMBOLUS_GEN_A_CORE
Gamaliel LOCKHART attempted to call [facility] at this time to notify staff pt to be d/c'd.  No answer at this time.  
[facility]   
no

## 2024-01-30 NOTE — ED PROVIDER NOTE - SKIN, MLM
Received Patients Pre-Op medical clearance from [facility] for his Right Shoulder surgery, scanned into patients chart and put up front in PCP's folder   Skin normal color for race, warm, dry and intact. No evidence of rash.

## 2024-08-13 NOTE — HISTORY OF PRESENT ILLNESS
[FreeTextEntry1] : 73-year-old male presents today for follow-up  Last seen 1 year ago -initially seen as ultrasound demonstrated bilateral stones, however on follow-up CT renal stone protocol no stones were seen.  A possible 1.7 cm left renal mass was seen MRI August 2023: 1.7 cm lesion from the kidney was hemorrhagic cyst  Presents for f/u

## 2024-08-14 ENCOUNTER — APPOINTMENT (OUTPATIENT)
Dept: UROLOGY | Facility: CLINIC | Age: 74
End: 2024-08-14

## 2025-01-08 ENCOUNTER — APPOINTMENT (OUTPATIENT)
Dept: ULTRASOUND IMAGING | Facility: IMAGING CENTER | Age: 75
End: 2025-01-08

## 2025-01-08 NOTE — ED PROVIDER NOTE - SKIN TURGOR
Per insurance,     For renewal or new start of [employer] a BMI/weight MUST be documented in the Office/Nurse/Video CHART NOTES (patient reported vitals do not print) to process the pa within the past 30 days. This is to ensure accuracy on renewals and now required by ins companies.      Please have patient schedule a weight/BMI check in person, if possible, for the most accurate weight.     Please re-request this pa by hitting the \"request pa\" when patient has the proper documentation on file         resilient/elastic

## 2025-04-02 ENCOUNTER — APPOINTMENT (OUTPATIENT)
Dept: RADIOLOGY | Facility: IMAGING CENTER | Age: 75
End: 2025-04-02
Payer: MEDICARE

## 2025-04-02 ENCOUNTER — OUTPATIENT (OUTPATIENT)
Dept: OUTPATIENT SERVICES | Facility: HOSPITAL | Age: 75
LOS: 1 days | End: 2025-04-02
Payer: COMMERCIAL

## 2025-04-02 DIAGNOSIS — M25.562 PAIN IN LEFT KNEE: ICD-10-CM
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
## 2025-04-02 DIAGNOSIS — M54.2 CERVICALGIA: ICD-10-CM

## 2025-04-02 DIAGNOSIS — S80.02XA CONTUSION OF LEFT KNEE, INITIAL ENCOUNTER: ICD-10-CM

## 2025-04-02 DIAGNOSIS — M79.642 PAIN IN LEFT HAND: ICD-10-CM

## 2025-04-02 PROCEDURE — 71100 X-RAY EXAM RIBS UNI 2 VIEWS: CPT | Mod: 26

## 2025-04-02 PROCEDURE — 72050 X-RAY EXAM NECK SPINE 4/5VWS: CPT | Mod: 26

## 2025-04-02 PROCEDURE — 73130 X-RAY EXAM OF HAND: CPT | Mod: 26,LT,RT

## 2025-04-02 PROCEDURE — 73562 X-RAY EXAM OF KNEE 3: CPT | Mod: 26,LT

## 2025-04-02 PROCEDURE — 72050 X-RAY EXAM NECK SPINE 4/5VWS: CPT

## 2025-04-02 PROCEDURE — 73562 X-RAY EXAM OF KNEE 3: CPT

## 2025-04-02 PROCEDURE — 73130 X-RAY EXAM OF HAND: CPT

## 2025-04-02 PROCEDURE — 71100 X-RAY EXAM RIBS UNI 2 VIEWS: CPT
